# Patient Record
Sex: FEMALE | Race: BLACK OR AFRICAN AMERICAN | Employment: UNEMPLOYED | URBAN - METROPOLITAN AREA
[De-identification: names, ages, dates, MRNs, and addresses within clinical notes are randomized per-mention and may not be internally consistent; named-entity substitution may affect disease eponyms.]

---

## 2017-01-12 ENCOUNTER — OFFICE VISIT (OUTPATIENT)
Dept: INTERNAL MEDICINE CLINIC | Age: 47
End: 2017-01-12

## 2017-01-12 ENCOUNTER — HOSPITAL ENCOUNTER (OUTPATIENT)
Dept: LAB | Age: 47
Discharge: HOME | End: 2017-01-12

## 2017-01-12 VITALS
TEMPERATURE: 98.8 F | OXYGEN SATURATION: 98 % | BODY MASS INDEX: 27.64 KG/M2 | HEIGHT: 66 IN | RESPIRATION RATE: 18 BRPM | HEART RATE: 88 BPM | DIASTOLIC BLOOD PRESSURE: 89 MMHG | SYSTOLIC BLOOD PRESSURE: 122 MMHG | WEIGHT: 172 LBS

## 2017-01-12 DIAGNOSIS — Z71.89 ADVANCE CARE PLANNING: ICD-10-CM

## 2017-01-12 DIAGNOSIS — Z12.31 ENCOUNTER FOR SCREENING MAMMOGRAM FOR MALIGNANT NEOPLASM OF BREAST: ICD-10-CM

## 2017-01-12 DIAGNOSIS — L30.9 ECZEMA, UNSPECIFIED TYPE: ICD-10-CM

## 2017-01-12 DIAGNOSIS — Z13.220 SCREENING FOR LIPID DISORDERS: ICD-10-CM

## 2017-01-12 DIAGNOSIS — F41.9 ANXIETY: ICD-10-CM

## 2017-01-12 DIAGNOSIS — Z13.29 SCREENING FOR ENDOCRINE, NUTRITIONAL, METABOLIC AND IMMUNITY DISORDER: ICD-10-CM

## 2017-01-12 DIAGNOSIS — Z13.21 SCREENING FOR ENDOCRINE, NUTRITIONAL, METABOLIC AND IMMUNITY DISORDER: ICD-10-CM

## 2017-01-12 DIAGNOSIS — Z13.0 SCREENING FOR ENDOCRINE, NUTRITIONAL, METABOLIC AND IMMUNITY DISORDER: ICD-10-CM

## 2017-01-12 DIAGNOSIS — Z23 ENCOUNTER FOR IMMUNIZATION: ICD-10-CM

## 2017-01-12 DIAGNOSIS — Z13.228 SCREENING FOR ENDOCRINE, NUTRITIONAL, METABOLIC AND IMMUNITY DISORDER: ICD-10-CM

## 2017-01-12 DIAGNOSIS — F17.210 CIGARETTE NICOTINE DEPENDENCE WITHOUT COMPLICATION: ICD-10-CM

## 2017-01-12 DIAGNOSIS — Z00.00 ADULT GENERAL MEDICAL EXAMINATION: Primary | ICD-10-CM

## 2017-01-12 PROCEDURE — 99001 SPECIMEN HANDLING PT-LAB: CPT | Performed by: NURSE PRACTITIONER

## 2017-01-12 RX ORDER — BUSPIRONE HYDROCHLORIDE 15 MG/1
15 TABLET ORAL 2 TIMES DAILY
Qty: 60 TAB | Refills: 11 | Status: SHIPPED | OUTPATIENT
Start: 2017-01-12 | End: 2018-02-19 | Stop reason: SDUPTHER

## 2017-01-12 RX ORDER — IBUPROFEN 200 MG
1 TABLET ORAL EVERY 24 HOURS
Qty: 30 PATCH | Refills: 2 | Status: SHIPPED | OUTPATIENT
Start: 2017-01-12 | End: 2017-06-13 | Stop reason: SDUPTHER

## 2017-01-12 RX ORDER — FLUTICASONE PROPIONATE 0.5 MG/G
CREAM TOPICAL 2 TIMES DAILY
Qty: 15 G | Refills: 0 | Status: SHIPPED | OUTPATIENT
Start: 2017-01-12 | End: 2017-02-08 | Stop reason: SDUPTHER

## 2017-01-12 NOTE — PATIENT INSTRUCTIONS
Eat a balanced diet, low-carb, low-salt AND exercise at least 150 minutes per week of moderate activity. If you begin to feel short of breath, dizzy, lightheaded, or have chest pain; STOP. If your symptoms DO NOT resolve after several minutes, DO NOT resume activity; you may need to call the office, report to an urgent care facility, or ER for chest pain.     !!!!!STOP SMOKING!!!!!     Call [phone]/([phone]) for counseling and support. Visit [email or web address]. gov for online support, live chat, and text messaging. Advance Directives: Care Instructions  Your Care Instructions  An advance directive is a legal way to state your wishes at the end of your life. It tells your family and your doctor what to do if you can no longer say what you want. There are two main types of advance directives. You can change them any time that your wishes change. · A living will tells your family and your doctor your wishes about life support and other treatment. · A medical power of  lets you name a person to make treatment decisions for you when you can't speak for yourself. This person is called a health care agent. If you do not have an advance directive, decisions about your medical care may be made by a doctor or a  who doesn't know you. It may help to think of an advance directive as a gift to the people who care for you. If you have one, they won't have to make tough decisions by themselves. Follow-up care is a key part of your treatment and safety. Be sure to make and go to all appointments, and call your doctor if you are having problems. It's also a good idea to know your test results and keep a list of the medicines you take. How can you care for yourself at home? · Discuss your wishes with your loved ones and your doctor. This way, there are no surprises. · Many states have a unique form. Or you might use a universal form that has been approved by many states.  This kind of form can sometimes be completed and stored online. Your electronic copy will then be available wherever you have a connection to the Internet. In most cases, doctors will respect your wishes even if you have a form from a different state. · You don't need a  to do an advance directive. But you may want to get legal advice. · Think about these questions when you prepare an advance directive:  ¨ Who do you want to make decisions about your medical care if you are not able to? Many people choose a family member, close friend, or doctor. ¨ Do you know enough about life support methods that might be used? If not, talk to your doctor so you understand. ¨ What are you most afraid of that might happen? You might be afraid of having pain, losing your independence, or being kept alive by machines. ¨ Where would you prefer to die? Choices include your home, a hospital, or a nursing home. ¨ Would you like to have information about hospice care to support you and your family? ¨ Do you want to donate organs when you die? ¨ Do you want certain Hinduism practices performed before you die? If so, put your wishes in the advance directive. · Read your advance directive every year, and make changes as needed. When should you call for help? Be sure to contact your doctor if you have any questions. Where can you learn more? Go to http://laurie-cherri.info/. Enter R264 in the search box to learn more about \"Advance Directives: Care Instructions. \"  Current as of: February 24, 2016  Content Version: 11.1  © 6560-6717 [employer]. Care instructions adapted under license by [employer] (which disclaims liability or warranty for this information). If you have questions about a medical condition or this instruction, always ask your healthcare professional. [address] any warranty or liability for your use of this information.        Mammogram: About This Test  What is it? A mammogram is an X-ray of the breast that is used to screen for breast cancer. This test can find tumors that are too small for you or your doctor to feel. Cancer is most easily treated and cured when it is found at an early stage. Why is this test done? A mammogram is done to:  · Look for breast cancer in women who don't have symptoms. · Find breast cancer in women who have symptoms. Symptoms of breast cancer may include a lump or thickening in the breast, nipple discharge, or dimpling of the skin on one area of the breast.  · Find an area of suspicious breast tissue to remove for an exam under a microscope (biopsy). How can you prepare for the test?  · Tell your doctor if you:  ¨ Are or might be pregnant. ¨ Are breastfeeding. ¨ Have breast implants. ¨ Have previously had a breast biopsy. · On the day of the test, don't use any deodorant, perfume, powders, or ointments. What happens before the test?  · You will need to take off any jewelry that might interfere with the X-ray pictures. · You will need to take off your clothes above the waist.  · You will be given a cloth or paper gown to use during the test.  What happens during the test?  · You usually stand during a mammogram.  · One at a time, your breasts will be placed on a flat plate that contains the X-ray film. · Another plate is then pressed firmly against your breast to help flatten out the breast tissue. You may be asked to lift your arm. · For a few seconds while the X-ray picture is being taken, you will need to hold your breath. · At least two pictures are taken of each breast. One is taken from the top and one from the side. What else should you know about the test?  · The X-ray plate will feel cold when you place your breast on it. Having your breasts flattened and squeezed isn't comfortable. But it is necessary to flatten out the breast tissue to get the best pictures.   · Mammograms do not prevent breast cancer or reduce a woman's risk of developing cancer. · Most things that are found during a mammogram are not breast cancer. How long does the test take? · The test will take about 10 to 15 minutes. You may be in the clinic for up to an hour. What happens after the test?  · You will probably be able to go home right away. · You can go back to your usual activities right away. Follow-up care is a key part of your treatment and safety. Be sure to make and go to all appointments, and call your doctor if you are having problems. It's also a good idea to keep a list of the medicines you take. Ask your doctor when you can expect to have your test results. Where can you learn more? Go to http://laurie-cherri.info/. Enter F993 in the search box to learn more about \"Mammogram: About This Test.\"  Current as of: July 26, 2016  Content Version: 11.1  © 6849-4721 [employer]. Care instructions adapted under license by [employer] (which disclaims liability or warranty for this information). If you have questions about a medical condition or this instruction, always ask your healthcare professional. [place] [address] any warranty or liability for your use of this information. Learning About Benefits From Quitting Smoking  How does quitting smoking make you healthier? If you're thinking about quitting smoking, you may have a few reasons to be smoke-free. Your health may be one of them. · When you quit smoking, you lower your risks for cancer, lung disease, heart attack, stroke, blood vessel disease, and blindness from macular degeneration. · When you're smoke-free, you get sick less often, and you heal faster. You are less likely to get colds, flu, bronchitis, and pneumonia. · As a nonsmoker, you may find that your mood is better and you are less stressed. When and how will you feel healthier?   Quitting has real health benefits that start from day 1 of being smoke-free. And the longer you stay smoke-free, the healthier you get and the better you feel. The first hours  · After just 20 minutes, your blood pressure and heart rate go down. That means there's less stress on your heart and blood vessels. · Within 12 hours, the level of carbon monoxide in your blood drops back to normal. That makes room for more oxygen. With more oxygen in your body, you may notice that you have more energy than when you smoked. After 2 weeks  · Your lungs start to work better. · Your risk of heart attack starts to drop. After 1 month  · When your lungs are clear, you cough less and breathe deeper, so it's easier to be active. · Your sense of taste and smell return. That means you can enjoy food more than you have since you started smoking. Over the years  · After 1 year, your risk of heart disease is half what it would be if you kept smoking. · After 5 years, your risk of stroke starts to shrink. Within a few years after that, it's about the same as if you'd never smoked. · After 10 years, your risk of dying from lung cancer is cut by about half. And your risk for many other types of cancer is lower too. How would quitting help others in your life? When you quit smoking, you improve the health of everyone who now breathes in your smoke. · Their heart, lung, and cancer risks drop, much like yours. · They are sick less. For babies and small children, living smoke-free means they're less likely to have ear infections, pneumonia, and bronchitis. · If you're a woman who is or will be pregnant someday, quitting smoking means a healthier . · Children who are close to you are less likely to become adult smokers. Where can you learn more? Go to http://laurie-cherri.info/. Enter [id number] in the search box to learn more about \"Learning About Benefits From Quitting Smoking. \"  Current as of: May 26, 2016  Content Version: 11.1  © 5536-6449 [employer]. Care instructions adapted under license by [employer] (which disclaims liability or warranty for this information). If you have questions about a medical condition or this instruction, always ask your healthcare professional. [address] any warranty or liability for your use of this information. Atopic Dermatitis: Care Instructions  Your Care Instructions  Atopic dermatitis (also called eczema) is a skin problem that causes intense itching and a red, raised rash. In severe cases, the rash develops clear fluid-filled blisters. The rash is not contagious. People with this condition seem to have very sensitive immune systems that are likely to react to things that cause allergies. The immune system is the body's way of fighting infection. There is no cure for atopic dermatitis, but you may be able to control it with care at home. Follow-up care is a key part of your treatment and safety. Be sure to make and go to all appointments, and call your doctor if you are having problems. It's also a good idea to know your test results and keep a list of the medicines you take. How can you care for yourself at home? · Use moisturizer at least twice a day. · If your doctor prescribes a cream, use it as directed. If your doctor prescribes other medicine, take it exactly as directed. · Wash the affected area with water only. Soap can make dryness and itching worse. Pat dry. · Apply a moisturizer after bathing. Use a cream such as Lubriderm, Moisturel, or Cetaphil that does not irritate the skin or cause a rash. Apply the cream while your skin is still damp after lightly drying with a towel. · Use cold, wet cloths to reduce itching. · Keep cool, and stay out of the sun. · If itching affects your normal activities, an over-the-counter antihistamine, such as diphenhydramine (Benadryl) or loratadine (Claritin) may help. Read and follow all instructions on the label.   When should you call for help? Call your doctor now or seek immediate medical care if:  · Your rash gets worse and you have a fever. · You have new blisters or bruises, or the rash spreads and looks like a sunburn. · You have signs of infection, such as:  ¨ Increased pain, swelling, warmth, or redness. ¨ Red streaks leading from the rash. ¨ Pus draining from the rash. ¨ A fever. · You have crusting or oozing sores. · You have joint aches or body aches along with your rash. Watch closely for changes in your health, and be sure to contact your doctor if:  · Your rash does not clear up after 2 to 3 weeks of home treatment. · Itching interferes with your sleep or daily activities. Where can you learn more? Go to http://laurie-cherri.info/. Enter D925 in the search box to learn more about \"Atopic Dermatitis: Care Instructions. \"  Current as of: February 5, 2016  Content Version: 11.1  © 6928-9685 [employer]. Care instructions adapted under license by [employer] (which disclaims liability or warranty for this information). If you have questions about a medical condition or this instruction, always ask your healthcare professional. [place] [address] any warranty or liability for your use of this information.

## 2017-01-12 NOTE — MR AVS SNAPSHOT
Visit Information Date & Time Provider Department Dept. Phone Encounter #  
 1/12/2017  8:40 AM Mallory Coello NP [address] [facility] [phone] [id number] Follow-up Instructions Return in about 6 months (around 7/12/2017) for 6 month f/u. Upcoming Health Maintenance Date Due Pneumococcal 19-64 Medium Risk (1 of 1 - PPSV23) 5/7/1990 PAP AKA CERVICAL CYTOLOGY 5/7/1992 INFLUENZA AGE 9 TO ADULT 8/1/2016 DTaP/Tdap/Td series (2 - Td) 12/14/2025 Allergies as of 1/12/2017  Review Complete On: 1/12/2017 By: Mallory Coello NP No Known Allergies Current Immunizations  Reviewed on 12/14/2015 Name Date Influenza Vaccine Intradermal PF 12/14/2015 Tdap 12/14/2015 Not reviewed this visit You Were Diagnosed With   
  
 Codes Comments Adult general medical examination    -  Primary ICD-10-CM: Z00.00 ICD-9-CM: V70.9 Advance care planning     ICD-10-CM: Z71.89 ICD-9-CM: V65.49 Anxiety     ICD-10-CM: F41.9 ICD-9-CM: 300.00 Eczema, unspecified type     ICD-10-CM: L30.9 ICD-9-CM: 692.9 Screening for lipid disorders     ICD-10-CM: Z13.220 ICD-9-CM: V77.91 Screening for endocrine, nutritional, metabolic and immunity disorder     ICD-10-CM: Z13.29, Z13.21, Z13.228, Z13.0 ICD-9-CM: V77.99 Encounter for screening mammogram for malignant neoplasm of breast     ICD-10-CM: Z12.31 
ICD-9-CM: V76.12 Cigarette nicotine dependence without complication     [facility]-YT: F17.210 ICD-9-CM: 305.1 Vitals BP Pulse Temp Resp Height(growth percentile) Weight(growth percentile) 122/89 (BP 1 Location: Left arm, BP Patient Position: Sitting) 88 98.8 °F (37.1 °C) (Oral) 18 5' 6\" (1.676 m) 172 lb (78 kg) SpO2 BMI OB Status Smoking Status 98% 27.76 kg/m2 Hysterectomy Current Every Day Smoker Vitals History BMI and BSA Data  Body Mass Index Body Surface Area  
 27.76 kg/m 2 1.91 m 2  
  
  
 [facility] [phone] Your Updated Medication List  
  
   
This list is accurate as of: 1/12/17  9:45 AM.  Always use your most recent med list.  
  
  
  
  
 busPIRone 15 mg tablet Commonly known as:  BUSPAR Take twice daily for 4 days. On Week 2 take three times daily, then on. fluticasone 0.05 % topical cream  
Commonly known as:  Aleksandra Skiff Apply  to affected area two (2) times a day. Prescriptions Sent to Pharmacy Refills  
 fluticasone (CUTIVATE) 0.05 % topical cream 0 Sig: Apply  to affected area two (2) times a day. Class: Normal  
 [facility] Ph #: [phone] Route: Topical  
  
We Performed the Following CBC WITH AUTOMATED DIFF [72732 CPT(R)] LIPID PANEL [44355 CPT(R)] METABOLIC PANEL, COMPREHENSIVE [74069 CPT(R)] TSH 3RD GENERATION [33241 CPT(R)] VITAMIN D, 25 HYDROXY [id number] CPT(R)] Follow-up Instructions Return in about 6 months (around 7/12/2017) for 6 month f/u. To-Do List   
 01/12/2017 Imaging:  [facility] M[facility]MO BI SCREENING INCL CAD Patient Instructions Eat a balanced diet, low-carb, low-salt AND exercise at least 150 minutes per week of moderate activity. If you begin to feel short of breath, dizzy, lightheaded, or have chest pain; STOP. If your symptoms DO NOT resolve after several minutes, DO NOT resume activity; you may need to call the office, report to an urgent care facility, or ER for chest pain.  
 
!!!!!STOP SMOKING!!!!!  
 
Call [phone]/([phone]) for counseling and support. Visit [email or web address]. gov for online support, live chat, and text messaging. Advance Directives: Care Instructions Your Care Instructions An advance directive is a legal way to state your wishes at the end of your life. It tells your family and your doctor what to do if you can no longer say what you want. There are two main types of advance directives. You can change them any time that your wishes change. · A living will tells your family and your doctor your wishes about life support and other treatment. · A medical power of  lets you name a person to make treatment decisions for you when you can't speak for yourself. This person is called a health care agent. If you do not have an advance directive, decisions about your medical care may be made by a doctor or a  who doesn't know you. It may help to think of an advance directive as a gift to the people who care for you. If you have one, they won't have to make tough decisions by themselves. Follow-up care is a key part of your treatment and safety. Be sure to make and go to all appointments, and call your doctor if you are having problems. It's also a good idea to know your test results and keep a list of the medicines you take. How can you care for yourself at home? · Discuss your wishes with your loved ones and your doctor. This way, there are no surprises. · Many states have a unique form. Or you might use a universal form that has been approved by many states. This kind of form can sometimes be completed and stored online. Your electronic copy will then be available wherever you have a connection to the Internet. In most cases, doctors will respect your wishes even if you have a form from a different state. · You don't need a  to do an advance directive. But you may want to get legal advice. · Think about these questions when you prepare an advance directive: ¨ Who do you want to make decisions about your medical care if you are not able to? Many people choose a family member, close friend, or doctor. ¨ Do you know enough about life support methods that might be used? If not, talk to your doctor so you understand. ¨ What are you most afraid of that might happen? You might be afraid of having pain, losing your independence, or being kept alive by machines. ¨ Where would you prefer to die? Choices include your home, a hospital, or a nursing home. ¨ Would you like to have information about hospice care to support you and your family? ¨ Do you want to donate organs when you die? ¨ Do you want certain [employer]ist practices performed before you die? If so, put your wishes in the advance directive. · Read your advance directive every year, and make changes as needed. When should you call for help? Be sure to contact your doctor if you have any questions. Where can you learn more? Go to http://laurie-cherri.info/. Enter R264 in the search box to learn more about \"Advance Directives: Care Instructions. \" Current as of: February 24, 2016 Content Version: 11.1 © 2006-2016 Apps & Zerts. Care instructions adapted under license by Right On Interactive (which disclaims liability or warranty for this information). If you have questions about a medical condition or this instruction, always ask your healthcare professional. [place] [address] any warranty or liability for your use of this information. Mammogram: About This Test 
What is it? A mammogram is an X-ray of the breast that is used to screen for breast cancer. This test can find tumors that are too small for you or your doctor to feel. Cancer is most easily treated and cured when it is found at an early stage. Why is this test done? A mammogram is done to: 
· Look for breast cancer in women who don't have symptoms. · Find breast cancer in women who have symptoms. Symptoms of breast cancer may include a lump or thickening in the breast, nipple discharge, or dimpling of the skin on one area of the breast. 
· Find an area of suspicious breast tissue to remove for an exam under a microscope (biopsy). How can you prepare for the test? 
· Tell your doctor if you: ¨ Are or might be pregnant. ¨ Are breastfeeding. ¨ Have breast implants. ¨ Have previously had a breast biopsy. · On the day of the test, don't use any deodorant, perfume, powders, or ointments. What happens before the test? 
· You will need to take off any jewelry that might interfere with the X-ray pictures. · You will need to take off your clothes above the waist. 
· You will be given a cloth or paper gown to use during the test. 
What happens during the test? 
· You usually stand during a mammogram. 
· One at a time, your breasts will be placed on a flat plate that contains the X-ray film. · Another plate is then pressed firmly against your breast to help flatten out the breast tissue. You may be asked to lift your arm. · For a few seconds while the X-ray picture is being taken, you will need to hold your breath. · At least two pictures are taken of each breast. One is taken from the top and one from the side. What else should you know about the test? 
· The X-ray plate will feel cold when you place your breast on it. Having your breasts flattened and squeezed isn't comfortable. But it is necessary to flatten out the breast tissue to get the best pictures. · Mammograms do not prevent breast cancer or reduce a woman's risk of developing cancer. · Most things that are found during a mammogram are not breast cancer. How long does the test take? · The test will take about 10 to 15 minutes. You may be in the clinic for up to an hour. What happens after the test? 
· You will probably be able to go home right away. · You can go back to your usual activities right away. Follow-up care is a key part of your treatment and safety. Be sure to make and go to all appointments, and call your doctor if you are having problems. It's also a good idea to keep a list of the medicines you take. Ask your doctor when you can expect to have your test results. Where can you learn more? Go to http://laurie-cherri.info/. Enter S420 in the search box to learn more about \"Mammogram: About This Test.\" Current as of: July 26, 2016 Content Version: 11.1 © 3135-6402 Gigwalk. Care instructions adapted under license by Blokify (which disclaims liability or warranty for this information). If you have questions about a medical condition or this instruction, always ask your healthcare professional. [address] any warranty or liability for your use of this information. Learning About Benefits From Quitting Smoking How does quitting smoking make you healthier? If you're thinking about quitting smoking, you may have a few reasons to be smoke-free. Your health may be one of them. · When you quit smoking, you lower your risks for cancer, lung disease, heart attack, stroke, blood vessel disease, and blindness from macular degeneration. · When you're smoke-free, you get sick less often, and you heal faster. You are less likely to get colds, flu, bronchitis, and pneumonia. · As a nonsmoker, you may find that your mood is better and you are less stressed. When and how will you feel healthier? Quitting has real health benefits that start from day 1 of being smoke-free. And the longer you stay smoke-free, the healthier you get and the better you feel. The first hours · After just 20 minutes, your blood pressure and heart rate go down. That means there's less stress on your heart and blood vessels. · Within 12 hours, the level of carbon monoxide in your blood drops back to normal. That makes room for more oxygen. With more oxygen in your body, you may notice that you have more energy than when you smoked. After 2 weeks · Your lungs start to work better. · Your risk of heart attack starts to drop. After 1 month · When your lungs are clear, you cough less and breathe deeper, so it's easier to be active. · Your sense of taste and smell return. That means you can enjoy food more than you have since you started smoking. Over the years · After 1 year, your risk of heart disease is half what it would be if you kept smoking. · After 5 years, your risk of stroke starts to shrink. Within a few years after that, it's about the same as if you'd never smoked. · After 10 years, your risk of dying from lung cancer is cut by about half. And your risk for many other types of cancer is lower too. How would quitting help others in your life? When you quit smoking, you improve the health of everyone who now breathes in your smoke. · Their heart, lung, and cancer risks drop, much like yours. · They are sick less. For babies and small children, living smoke-free means they're less likely to have ear infections, pneumonia, and bronchitis. · If you're a woman who is or will be pregnant someday, quitting smoking means a healthier . · Children who are close to you are less likely to become adult smokers. Where can you learn more? Go to http://laurieEco-Sitecherri.info/. Enter [id number] in the search box to learn more about \"Learning About Benefits From Quitting Smoking. \" Current as of: May 26, 2016 Content Version: 11.1 © 0729-7051 G2 Microsystems. Care instructions adapted under license by ZikBit (which disclaims liability or warranty for this information). If you have questions about a medical condition or this instruction, always ask your healthcare professional. [place] [address] any warranty or liability for your use of this information. Atopic Dermatitis: Care Instructions Your Care Instructions Atopic dermatitis (also called eczema) is a skin problem that causes intense itching and a red, raised rash.  In severe cases, the rash develops clear fluidfilled blisters. The rash is not contagious. People with this condition seem to have very sensitive immune systems that are likely to react to things that cause allergies. The immune system is the body's way of fighting infection. There is no cure for atopic dermatitis, but you may be able to control it with care at home. Follow-up care is a key part of your treatment and safety. Be sure to make and go to all appointments, and call your doctor if you are having problems. It's also a good idea to know your test results and keep a list of the medicines you take. How can you care for yourself at home? · Use moisturizer at least twice a day. · If your doctor prescribes a cream, use it as directed. If your doctor prescribes other medicine, take it exactly as directed. · Wash the affected area with water only. Soap can make dryness and itching worse. Pat dry. · Apply a moisturizer after bathing. Use a cream such as Lubriderm, Moisturel, or Cetaphil that does not irritate the skin or cause a rash. Apply the cream while your skin is still damp after lightly drying with a towel. · Use cold, wet cloths to reduce itching. · Keep cool, and stay out of the sun. · If itching affects your normal activities, an over-the-counter antihistamine, such as diphenhydramine (Benadryl) or loratadine (Claritin) may help. Read and follow all instructions on the label. When should you call for help? Call your doctor now or seek immediate medical care if: 
· Your rash gets worse and you have a fever. · You have new blisters or bruises, or the rash spreads and looks like a sunburn. · You have signs of infection, such as: 
¨ Increased pain, swelling, warmth, or redness. ¨ Red streaks leading from the rash. ¨ Pus draining from the rash. ¨ A fever. · You have crusting or oozing sores. · You have joint aches or body aches along with your rash.  
Watch closely for changes in your health, and be sure to contact your doctor if: 
· Your rash does not clear up after 2 to 3 weeks of home treatment. · Itching interferes with your sleep or daily activities. Where can you learn more? Go to http://laurie-cherri.info/. Enter L104 in the search box to learn more about \"Atopic Dermatitis: Care Instructions. \" Current as of: February 5, 2016 Content Version: 11.1 © 2742-3197 ExaGrid Systems. Care instructions adapted under license by Promosome (which disclaims liability or warranty for this information). If you have questions about a medical condition or this instruction, always ask your healthcare professional. [place] [address] any warranty or liability for your use of this information. Introducing [facility]! [facility] introduces Trovit patient portal. Now you can access parts of your medical record, email your doctor's office, and request medication refills online. 1. In your internet browser, go to [email or web address] 2. Click on the First Time User? Click Here link in the Sign In box. You will see the New Member Sign Up page. 3. Enter your Pax8t Access Code exactly as it appears below. You will not need to use this code after youve completed the sign-up process. If you do not sign up before the expiration date, you must request a new code. · Trovit Access Code: [facility] Expires: 4/12/2017  8:42 AM 
 
4. Enter the last four digits of your Social Security Number (xxxx) and Date of Birth (mm/dd/yyyy) as indicated and click Submit. You will be taken to the next sign-up page. 5. Create a Pax8t ID. This will be your Trovit login ID and cannot be changed, so think of one that is secure and easy to remember. 6. Create a Trovit password. You can change your password at any time. 7. Enter your Password Reset Question and Answer. This can be used at a later time if you forget your password. 8. Enter your e-mail address. You will receive e-mail notification when new information is available in [address] 9. Click Sign Up. You can now view and download portions of your medical record. 10. Click the Download Summary menu link to download a portable copy of your medical information. If you have questions, please visit the Frequently Asked Questions section of the HipFlat website. Remember, HipFlat is NOT to be used for urgent needs. For medical emergencies, dial 911. Now available from your iPhone and Android! Please provide this summary of care documentation to your next provider. Your primary care clinician is listed as LEW Mccartney. If you have any questions after today's visit, please call [phone].

## 2017-01-12 NOTE — PROGRESS NOTES
Dorothy Baez is a 40 y.o. female and presents with Anxiety (follow up) and Annual Exam    Subjective: Dorothy Baez is a 40 y.o. female presenting for annual checkup. ROS: Feeling well. No dyspnea or chest pain on exertion. No abdominal pain, change in bowel habits, black or bloody stools. No urinary tract or prostatic symptoms. No neurological complaints. Specific concerns today: rash on right foot with itching and still having anxiety. Treated well with Buspar BID. Had grandchild since last OV and very pre-occupied with helping to care for her. Discussed ADVANCED DIRECTIVE: yes  Advanced Directive on File: no    Last PAP BEFORE hysterectomy 5 yr ago, unsure of testing AFTER. Unsure of last MAMMO. Smoking cessation Review:  Patient presents to discuss smoking cessation. The patient currently is smoking 1/2  ppd. She has smoked for multiple  years. She has tried to quit multiple times in the past using gum, but felt it was too strong. Would like to quit by taking patches.       Review of Systems  Constitutional: negative for fevers, chills, anorexia and weight loss  Eyes:   negative for visual disturbance, drainage, and irritation  ENT:   negative for tinnitus,sore throat,nasal congestion,ear pain,and hoarseness  Respiratory:  negative for cough, hemoptysis, dyspnea, and wheezing  CV:   negative for chest pain, palpitations, and lower extremity edema  GI:   negative for nausea, vomiting, diarrhea, abdominal pain, and melena  Endo:               negative for polyuria,polydipsia,polyphagia, and heat intolerance  Genitourinary: negative for frequency, urgency, dysuria, retention, and hematuria  Integument:  negative for ulcerations  Hematologic:  negative for easy bruising and bleeding  Musculoskel: negative for arthralgias, muscle weakness,and joint pain/swelling  Neurological:  negative for headaches, dizziness, vertigo,and memory/gait problems  Behavl/Psych: negative for feelings of depression, suicide, and mood changes    History reviewed. No pertinent past medical history. Past Surgical History   Procedure Laterality Date    Hx partial hysterectomy       Social History     Social History    Marital status: SINGLE     Spouse name: N/A    Number of children: N/A    Years of education: N/A     Social History Main Topics    Smoking status: Current Every Day Smoker    Smokeless tobacco: None    Alcohol use 0.0 oz/week     0 Standard drinks or equivalent per week      Comment: social    Drug use: No    Sexual activity: Yes     Partners: Male     Birth control/ protection: None     Other Topics Concern    None     Social History Narrative     Family History   Problem Relation Age of Onset    Cancer Mother      cancer of the blood    Diabetes Maternal Aunt      Current Outpatient Prescriptions   Medication Sig Dispense Refill    fluticasone (CUTIVATE) 0.05 % topical cream Apply  to affected area two (2) times a day. 15 g 0    nicotine (NICODERM CQ) 14 mg/24 hr patch 1 Patch by TransDERmal route every twenty-four (24) hours for 30 days. Indications: SMOKING CESSATION 30 Patch 2    busPIRone (BUSPAR) 15 mg tablet Take 1 Tab by mouth two (2) times a day. Take twice daily for 4 days. On Week 2 take three times daily, then on. 60 Tab 11     No Known Allergies    Objective:  Visit Vitals    /89 (BP 1 Location: Left arm, BP Patient Position: Sitting)    Pulse 88    Temp 98.8 °F (37.1 °C) (Oral)    Resp 18    Ht 5' 6\" (1.676 m)    Wt 172 lb (78 kg)    SpO2 98%    BMI 27.76 kg/m2     Wt Readings from Last 3 Encounters:   01/12/17 172 lb (78 kg)   04/07/16 164 lb (74.4 kg)   12/14/15 158 lb (71.7 kg)     Physical Exam:   General appearance - alert, well appearing, and in no distress. Mental status - A/O x 4, normal mood and affect. Head/Eyes- AT/NC. EDUARDO, EOMI, corneas normal, no foreign bodies. Ears- TM intact bilaterally, no erythema or drainage. Nose- Septum midline, pink mucosa. Turbinates normal, no polyps or erythema. No sinus tenderness. Mouth/Throat - mucous membranes moist, pharynx normal without lesions. No tonsillar swelling or exudates. Neck -Supple ,normal CSP. FROM, non-tender. No adenopathy/thyromegaly. No JVD. Chest - CTA. Symmetric chest rise. No wheezing, rales or rhonchi. Heart - Normal rate, regular rhythm. Normal S1, S2. No MGR. Abdomen - Soft,non-distended. Normoactive BS in all quadrants. NT, no mass, rebound, or HSM   Ext- Radial, DP pulses, 2+ bilaterally. No pedal edema, clubbing, or cyanosis. Skin- Normal for ethnicity, warm, and dry. No hyperpigmentation, ulcerations, or suspicious lesions. Maculopapular rash to right foot dorsum. Neuro - Normal speech, no focal findings. Normal strength and muscle tone. Coordination and gait normal.      No results found for this or any previous visit. Assessment/Plan:  MAMMO and labs ordered. Refilled buspar BID. cutivate for dermatitis and skin care reviewed. Anti-inflammatory LCS advised. Smoking cessation discussed for 3-10 minutes, pt planning to patches, sent meds today. Pt has given consent verbally while in office for [employer]. ICD-10-CM ICD-9-CM    1. Adult general medical examination Z00.00 V70.9 fluticasone (CUTIVATE) 0.05 % topical cream      LIPID PANEL      METABOLIC PANEL, COMPREHENSIVE      CBC WITH AUTOMATED DIFF      TSH 3RD GENERATION      VITAMIN D, 25 HYDROXY   2. Advance care planning Z71.89 V65.49    3. Anxiety F41.9 300.00 busPIRone (BUSPAR) 15 mg tablet   4. Eczema, unspecified type L30.9 692.9 fluticasone (CUTIVATE) 0.05 % topical cream   5. Screening for lipid disorders Z13.220 V77.91 LIPID PANEL   6. Screening for endocrine, nutritional, metabolic and immunity disorder W26.36 I04.09 METABOLIC PANEL, COMPREHENSIVE    Z13.21  CBC WITH AUTOMATED DIFF    Z13.228  TSH 3RD GENERATION    Z13.0  VITAMIN D, 25 HYDROXY   7.  Encounter for screening mammogram for malignant neoplasm of breast Z12.31 V76.12 [facility] BI SCREENING INCL CAD   8. Cigarette nicotine dependence without complication P41.004 690.9 nicotine (NICODERM CQ) 14 mg/24 hr patch   9. Encounter for immunization Z23 V03.89 PNEUMOCOCCAL POLYSACCHARIDE VACCINE, 23-VALENT, ADULT OR IMMUNOSUPPRESSED PT DOSE,     Orders Placed This Encounter    [facility] BI SCREENING INCL CAD     Standing Status:   Future     Standing Expiration Date:   2018     Order Specific Question:   Reason for Exam     Answer:   breast cancer screening     Order Specific Question:   Is Patient Pregnant? Answer:   No    PNEUMOCOCCAL POLYSACCHARIDE VACCINE, 23-VALENT, ADULT OR IMMUNOSUPPRESSED PT DOSE,    LIPID PANEL    METABOLIC PANEL, COMPREHENSIVE    CBC WITH AUTOMATED DIFF    TSH 3RD GENERATION    VITAMIN D, 25 HYDROXY    fluticasone (CUTIVATE) 0.05 % topical cream     Sig: Apply  to affected area two (2) times a day. Dispense:  15 g     Refill:  0    nicotine (NICODERM CQ) 14 mg/24 hr patch     Si Patch by TransDERmal route every twenty-four (24) hours for 30 days. Indications: SMOKING CESSATION     Dispense:  30 Patch     Refill:  2    busPIRone (BUSPAR) 15 mg tablet     Sig: Take 1 Tab by mouth two (2) times a day. Take twice daily for 4 days. On Week 2 take three times daily, then on. Dispense:  60 Tab     Refill:  11     Follow-up Disposition:  Return in about 6 months (around 2017) for 6 month f/u. Jennifer Coyle expressed understanding of plan. An After Visit Summary was offered/printed and given to the patient.

## 2017-01-12 NOTE — ACP (ADVANCE CARE PLANNING)
Advanced care planning- discussed Advance directive, Medical POA, and life sustaining options. Given/Mailing honoring amaysim paper work and contact info for [facility] to complete paperwork in office. Advance Care Planning (ACP) Provider Conversation Snapshot    Date of ACP Conversation: 01/12/17  Persons included in Conversation:  Patient/family  Length of ACP Conversation in minutes:  5-10 minutes    Authorized Decision Maker (if patient is incapable of making informed decisions): This person is:   Healthcare Agent/Medical Power of  under Advance Directive          For Patients with Decision Making Capacity:   Values/Goals: Exploration of values, goals, and preferences if recovery is not expected, even with continued medical treatment in the event of:  Severe, permanent brain injury  \"In these circumstances, what matters most to you? \"  Care focused more on comfort or quality of life.     Conversation Outcomes / Follow-Up Plan:   Recommended completion of Advance Directive form after review of ACP materials and conversation with prospective healthcare agent   Referral made for ACP follow-up assistance to:  Nurse navigator

## 2017-01-12 NOTE — PROGRESS NOTES
1. Have you been to the ER, urgent care clinic since your last visit? Hospitalized since your last visit? No    2. Have you seen or consulted any other health care providers outside of the [address] [place] [address] since your last visit? Include any pap smears or colon screening. No     Pt is here for   Chief Complaint   Patient presents with    Anxiety     follow up     Pt denies pain at this time. .. Troy Orantes is a 40 y.o. female who presents for routine immunizations. She denies any symptoms , reactions or allergies that would exclude them from being immunized today. Risks and adverse reactions were discussed and the VIS was given to them. All questions were addressed. She was observed for 10 min post injection. There were no reactions observed. Barbi Gibson LPN

## 2017-01-13 LAB
25(OH)D3+25(OH)D2 SERPL-MCNC: 9.2 NG/ML (ref 30–100)
ALBUMIN SERPL-MCNC: 4.4 G/DL (ref 3.5–5.5)
ALBUMIN/GLOB SERPL: 1.5 {RATIO} (ref 1.1–2.5)
ALP SERPL-CCNC: 49 IU/L (ref 39–117)
ALT SERPL-CCNC: 12 IU/L (ref 0–32)
AST SERPL-CCNC: 12 IU/L (ref 0–40)
BASOPHILS # BLD AUTO: 0 X10E3/UL (ref 0–0.2)
BASOPHILS NFR BLD AUTO: 0 %
BILIRUB SERPL-MCNC: <0.2 MG/DL (ref 0–1.2)
BUN SERPL-MCNC: 11 MG/DL (ref 6–24)
BUN/CREAT SERPL: 15 (ref 9–23)
CALCIUM SERPL-MCNC: 9.4 MG/DL (ref 8.7–10.2)
CHLORIDE SERPL-SCNC: 102 MMOL/L (ref 96–106)
CHOLEST SERPL-MCNC: 153 MG/DL (ref 100–199)
CO2 SERPL-SCNC: 21 MMOL/L (ref 18–29)
CREAT SERPL-MCNC: 0.72 MG/DL (ref 0.57–1)
EOSINOPHIL # BLD AUTO: 0.1 X10E3/UL (ref 0–0.4)
EOSINOPHIL NFR BLD AUTO: 2 %
ERYTHROCYTE [DISTWIDTH] IN BLOOD BY AUTOMATED COUNT: 12.6 % (ref 12.3–15.4)
GLOBULIN SER CALC-MCNC: 3 G/DL (ref 1.5–4.5)
GLUCOSE SERPL-MCNC: 96 MG/DL (ref 65–99)
HCT VFR BLD AUTO: 38.8 % (ref 34–46.6)
HDLC SERPL-MCNC: 54 MG/DL
HGB BLD-MCNC: 12.9 G/DL (ref 11.1–15.9)
IMM GRANULOCYTES # BLD: 0 X10E3/UL (ref 0–0.1)
IMM GRANULOCYTES NFR BLD: 0 %
INTERPRETATION, 910389: NORMAL
LDLC SERPL CALC-MCNC: 69 MG/DL (ref 0–99)
LYMPHOCYTES # BLD AUTO: 1.9 X10E3/UL (ref 0.7–3.1)
LYMPHOCYTES NFR BLD AUTO: 33 %
MCH RBC QN AUTO: 29.9 PG (ref 26.6–33)
MCHC RBC AUTO-ENTMCNC: 33.2 G/DL (ref 31.5–35.7)
MCV RBC AUTO: 90 FL (ref 79–97)
MONOCYTES # BLD AUTO: 0.3 X10E3/UL (ref 0.1–0.9)
MONOCYTES NFR BLD AUTO: 5 %
NEUTROPHILS # BLD AUTO: 3.4 X10E3/UL (ref 1.4–7)
NEUTROPHILS NFR BLD AUTO: 60 %
PLATELET # BLD AUTO: 371 X10E3/UL (ref 150–379)
POTASSIUM SERPL-SCNC: 4.2 MMOL/L (ref 3.5–5.2)
PROT SERPL-MCNC: 7.4 G/DL (ref 6–8.5)
RBC # BLD AUTO: 4.31 X10E6/UL (ref 3.77–5.28)
SODIUM SERPL-SCNC: 140 MMOL/L (ref 134–144)
TRIGL SERPL-MCNC: 152 MG/DL (ref 0–149)
TSH SERPL DL<=0.005 MIU/L-ACNC: 0.88 UIU/ML (ref 0.45–4.5)
VLDLC SERPL CALC-MCNC: 30 MG/DL (ref 5–40)
WBC # BLD AUTO: 5.7 X10E3/UL (ref 3.4–10.8)

## 2017-01-16 DIAGNOSIS — E55.9 VITAMIN D DEFICIENCY: Primary | ICD-10-CM

## 2017-01-16 RX ORDER — ACETAMINOPHEN 500 MG
2000 TABLET ORAL 2 TIMES DAILY
Qty: 60 CAP | Refills: 11 | Status: SHIPPED | OUTPATIENT
Start: 2017-01-16 | End: 2018-04-09 | Stop reason: SDUPTHER

## 2017-04-08 DIAGNOSIS — L30.9 ECZEMA, UNSPECIFIED TYPE: ICD-10-CM

## 2017-04-08 DIAGNOSIS — Z00.00 ADULT GENERAL MEDICAL EXAMINATION: ICD-10-CM

## 2017-04-10 RX ORDER — FLUTICASONE PROPIONATE 0.5 MG/G
CREAM TOPICAL
Qty: 30 G | Refills: 1 | Status: SHIPPED | OUTPATIENT
Start: 2017-04-10 | End: 2019-07-02 | Stop reason: ALTCHOICE

## 2017-05-02 ENCOUNTER — TELEPHONE (OUTPATIENT)
Dept: INTERNAL MEDICINE CLINIC | Age: 47
End: 2017-05-02

## 2017-05-02 DIAGNOSIS — H53.8 BLURRING OF VISION: Primary | ICD-10-CM

## 2017-05-02 NOTE — TELEPHONE ENCOUNTER
Referred to DR. Krzysztof Wolfe in past for similar complaint, would like to know if she saw him? Otherwise, I have referred her to [facility] with Dr. Amee Black to be evaluated. If they do NOT accept her insurance then she will need to find an in network provider and get their contact information.

## 2017-05-03 NOTE — TELEPHONE ENCOUNTER
Pt was given information  on yesterday for Dr. Venkata Frausto and informed to call us with the date and time of appt in case ins referral is needed.

## 2017-06-13 DIAGNOSIS — F17.210 CIGARETTE NICOTINE DEPENDENCE WITHOUT COMPLICATION: ICD-10-CM

## 2017-06-15 RX ORDER — IBUPROFEN 200 MG
TABLET ORAL
Qty: 28 PATCH | Refills: 2 | Status: SHIPPED | OUTPATIENT
Start: 2017-06-15 | End: 2018-04-06 | Stop reason: SDUPTHER

## 2018-04-06 ENCOUNTER — OFFICE VISIT (OUTPATIENT)
Dept: INTERNAL MEDICINE CLINIC | Age: 48
End: 2018-04-06

## 2018-04-06 VITALS
SYSTOLIC BLOOD PRESSURE: 145 MMHG | HEART RATE: 81 BPM | BODY MASS INDEX: 25.71 KG/M2 | WEIGHT: 160 LBS | RESPIRATION RATE: 18 BRPM | HEIGHT: 66 IN | DIASTOLIC BLOOD PRESSURE: 94 MMHG | OXYGEN SATURATION: 100 % | TEMPERATURE: 97.7 F

## 2018-04-06 DIAGNOSIS — Z11.4 SCREENING FOR HIV WITHOUT PRESENCE OF RISK FACTORS: ICD-10-CM

## 2018-04-06 DIAGNOSIS — Z12.39 ENCOUNTER FOR SCREENING FOR MALIGNANT NEOPLASM OF BREAST: ICD-10-CM

## 2018-04-06 DIAGNOSIS — E55.9 VITAMIN D DEFICIENCY: ICD-10-CM

## 2018-04-06 DIAGNOSIS — Z13.21 SCREENING FOR ENDOCRINE, NUTRITIONAL, METABOLIC AND IMMUNITY DISORDER: ICD-10-CM

## 2018-04-06 DIAGNOSIS — Z13.29 SCREENING FOR ENDOCRINE, NUTRITIONAL, METABOLIC AND IMMUNITY DISORDER: ICD-10-CM

## 2018-04-06 DIAGNOSIS — F17.210 CIGARETTE NICOTINE DEPENDENCE WITHOUT COMPLICATION: ICD-10-CM

## 2018-04-06 DIAGNOSIS — R03.0 ELEVATED BP WITHOUT DIAGNOSIS OF HYPERTENSION: ICD-10-CM

## 2018-04-06 DIAGNOSIS — Z13.0 SCREENING FOR ENDOCRINE, NUTRITIONAL, METABOLIC AND IMMUNITY DISORDER: ICD-10-CM

## 2018-04-06 DIAGNOSIS — Z00.00 ADULT GENERAL MEDICAL EXAMINATION: Primary | ICD-10-CM

## 2018-04-06 DIAGNOSIS — Z13.228 SCREENING FOR ENDOCRINE, NUTRITIONAL, METABOLIC AND IMMUNITY DISORDER: ICD-10-CM

## 2018-04-06 DIAGNOSIS — F41.9 ANXIETY: ICD-10-CM

## 2018-04-06 RX ORDER — IBUPROFEN 200 MG
1 TABLET ORAL EVERY 24 HOURS
Qty: 30 PATCH | Refills: 5 | Status: SHIPPED | OUTPATIENT
Start: 2018-04-06 | End: 2019-07-02 | Stop reason: SDUPTHER

## 2018-04-06 RX ORDER — BUSPIRONE HYDROCHLORIDE 15 MG/1
15 TABLET ORAL 2 TIMES DAILY
Qty: 60 TAB | Refills: 11 | Status: SHIPPED | OUTPATIENT
Start: 2018-04-06 | End: 2019-05-28 | Stop reason: SDUPTHER

## 2018-04-06 NOTE — MR AVS SNAPSHOT
[address] [place] [address] Chaz Hastings [address] 
[phone] Patient: Nick Argueta MRN: [id number] XYX:[id number]/[id number] Visit Information Date & Time Provider Department Dept. Phone Encounter #  
 4/6/2018  9:00 AM Teo Cervantes NP [address] [facility] [phone] [id number] Follow-up Instructions Return in about 6 months (around 10/6/2018) for elevated BP, 6 month f/u. Upcoming Health Maintenance Date Due  
 PAP AKA CERVICAL CYTOLOGY 5/7/1992 DTaP/Tdap/Td series (2 - Td) 12/14/2025 Allergies as of 4/6/2018  Review Complete On: 4/6/2018 By: Teo Cervantes NP No Known Allergies Current Immunizations  Reviewed on 1/12/2017 Name Date Influenza Vaccine Intradermal PF 12/14/2015 Pneumococcal Polysaccharide (PPSV-23) 1/12/2017 Tdap 12/14/2015 Not reviewed this visit You Were Diagnosed With   
  
 Codes Comments Adult general medical examination    -  Primary ICD-10-CM: Z00.00 ICD-9-CM: V70.9 Cigarette nicotine dependence without complication     BMW-08-IO: F17.210 ICD-9-CM: 305.1 Screening for HIV without presence of risk factors     ICD-10-CM: Z11.4 ICD-9-CM: V73.89 Screening for endocrine, nutritional, metabolic and immunity disorder     ICD-10-CM: Z13.29, Z13.21, Z13.228, Z13.0 ICD-9-CM: V77.99 Encounter for screening for malignant neoplasm of breast     ICD-10-CM: Z12.31 
ICD-9-CM: V76.10 Elevated BP without diagnosis of hypertension     ICD-10-CM: R03.0 ICD-9-CM: 796.2 Vitamin D deficiency     ICD-10-CM: E55.9 ICD-9-CM: 268.9 Vitals BP Pulse Temp Resp Height(growth percentile) Weight(growth percentile) (!) 134/99 (BP 1 Location: Left arm, BP Patient Position: Sitting) 81 97.7 °F (36.5 °C) (Oral) 18 5' 6\" (1.676 m) 160 lb (72.6 kg) SpO2 BMI OB Status Smoking Status 100% 25.82 kg/m2 Hysterectomy Current Every Day Smoker Vitals History BMI and BSA Data Body Mass Index Body Surface Area  
 25.82 kg/m 2 1.84 m 2 Preferred Pharmacy Pharmacy Name Phone [id number] [address] [facility] [address] [phone] Your Updated Medication List  
  
   
This list is accurate as of 18 10:50 AM.  Always use your most recent med list.  
  
  
  
  
 busPIRone 15 mg tablet Commonly known as:  BUSPAR  
TAKE 1 TABLET BY MOUTH TWICE DAILY-ON WEEK TWO TAKE THREE TIMES DAILY Cholecalciferol (Vitamin D3) 2,000 unit Cap capsule Commonly known as:  VITAMIN D3 Take 2,000 Units by mouth two (2) times a day. Indications: VITAMIN D DEFICIENCY (HIGH DOSE THERAPY)  
  
 fluticasone 0.05 % topical cream  
Commonly known as:  CUTIVATE  
apply to affected area twice a day  
  
 nicotine 14 mg/24 hr patch Commonly known as:  NICODERM CQ  
1 Patch by TransDERmal route every twenty-four (24) hours. Prescriptions Sent to Pharmacy Refills  
 nicotine (NICODERM CQ) 14 mg/24 hr patch 5 Si Patch by TransDERmal route every twenty-four (24) hours. Class: Normal  
 Pharmacy: [facility] [address] Ph #: [phone] Route: TransDERmal  
  
We Performed the Following CBC W/O DIFF [46966 CPT(R)] HIV 1/2 AG/AB, 4TH GENERATION,W RFLX CONFIRM [id number] CPT(R)] METABOLIC PANEL, BASIC [84883 CPT(R)] VITAMIN D, 25 HYDROXY [id number] CPT(R)] Follow-up Instructions Return in about 6 months (around 10/6/2018) for elevated BP, 6 month f/u. To-Do List   
 2018 Imaging:  [facility] MAMMO BI SCREENING INCL CAD Patient Instructions Elevated Blood Pressure: Care Instructions Your Care Instructions Blood pressure is a measure of how hard the blood pushes against the walls of your arteries. It's normal for blood pressure to go up and down throughout the day.  But if it stays up over time, you have high blood pressure. Two numbers tell you your blood pressure. The first number is the systolic pressure. It shows how hard the blood pushes when your heart is pumping. The second number is the diastolic pressure. It shows how hard the blood pushes between heartbeats, when your heart is relaxed and filling with blood. An ideal blood pressure in adults is less than 120/80 (say \"120 over 80\"). High blood pressure is 140/90 or higher. You have high blood pressure if your top number is 140 or higher or your bottom number is 90 or higher, or both. The main test for high blood pressure is simple, fast, and painless. To diagnose high blood pressure, your doctor will test your blood pressure at different times. After testing your blood pressure, your doctor may ask you to test it again when you are home. If you are diagnosed with high blood pressure, you can work with your doctor to make a long-term plan to manage it. Follow-up care is a key part of your treatment and safety. Be sure to make and go to all appointments, and call your doctor if you are having problems. It's also a good idea to know your test results and keep a list of the medicines you take. How can you care for yourself at home? · Do not smoke. Smoking increases your risk for heart attack and stroke. If you need help quitting, talk to your doctor about stop-smoking programs and medicines. These can increase your chances of quitting for good. · Stay at a healthy weight. · Try to limit how much sodium you eat to less than 2,300 milligrams (mg) a day. Your doctor may ask you to try to eat less than 1,500 mg a day. · Be physically active. Get at least 30 minutes of exercise on most days of the week. Walking is a good choice. You also may want to do other activities, such as running, swimming, cycling, or playing tennis or team sports. · Avoid or limit alcohol. Talk to your doctor about whether you can drink any alcohol. · Eat plenty of fruits, vegetables, and low-fat dairy products. Eat less saturated and total fats. · Learn how to check your blood pressure at home. When should you call for help? Call your doctor now or seek immediate medical care if: 
? · Your blood pressure is much higher than normal (such as 180/110 or higher). ? · You think high blood pressure is causing symptoms such as: ¨ Severe headache. ¨ Blurry vision. ? Watch closely for changes in your health, and be sure to contact your doctor if: 
? · You do not get better as expected. Where can you learn more? Go to http://laurie-cherri.info/. Enter F937 in the search box to learn more about \"Elevated Blood Pressure: Care Instructions. \" Current as of: September 21, 2016 Content Version: 11.4 © 7534-3078 [employer]. Care instructions adapted under license by [employer] (which disclaims liability or warranty for this information). If you have questions about a medical condition or this instruction, always ask your healthcare professional. [place] [address] any warranty or liability for your use of this information. Well Visit, Ages 25 to 48: Care Instructions Your Care Instructions Physical exams can help you stay healthy. Your doctor has checked your overall health and may have suggested ways to take good care of yourself. He or she also may have recommended tests. At home, you can help prevent illness with healthy eating, regular exercise, and other steps. Follow-up care is a key part of your treatment and safety. Be sure to make and go to all appointments, and call your doctor if you are having problems. It's also a good idea to know your test results and keep a list of the medicines you take. How can you care for yourself at home? · Reach and stay at a healthy weight. This will lower your risk for many problems, such as obesity, diabetes, heart disease, and high blood pressure. · Get at least 30 minutes of physical activity on most days of the week. Walking is a good choice. You also may want to do other activities, such as running, swimming, cycling, or playing tennis or team sports. Discuss any changes in your exercise program with your doctor. · Do not smoke or allow others to smoke around you. If you need help quitting, talk to your doctor about stop-smoking programs and medicines. These can increase your chances of quitting for good. · Talk to your doctor about whether you have any risk factors for sexually transmitted infections (STIs). Having one sex partner (who does not have STIs and does not have sex with anyone else) is a good way to avoid these infections. · Use birth control if you do not want to have children at this time. Talk with your doctor about the choices available and what might be best for you. · Protect your skin from too much sun. When you're outdoors from 10 a.m. to 4 p.m., stay in the shade or cover up with clothing and a hat with a wide brim. Wear sunglasses that block UV rays. Even when it's cloudy, put broad-spectrum sunscreen (SPF 30 or higher) on any exposed skin. · See a dentist one or two times a year for checkups and to have your teeth cleaned. · Wear a seat belt in the car. · Drink alcohol in moderation, if at all. That means no more than 2 drinks a day for men and 1 drink a day for women. Follow your doctor's advice about when to have certain tests. These tests can spot problems early. For everyone · Cholesterol. Have the fat (cholesterol) in your blood tested after age 21. Your doctor will tell you how often to have this done based on your age, family history, or other things that can increase your risk for heart disease. · Blood pressure. Have your blood pressure checked during a routine doctor visit. Your doctor will tell you how often to check your blood pressure based on your age, your blood pressure results, and other factors. · Vision. Talk with your doctor about how often to have a glaucoma test. 
· Diabetes. Ask your doctor whether you should have tests for diabetes. · Colon cancer. Have a test for colon cancer at age 48. You may have one of several tests. If you are younger than 48, you may need a test earlier if you have any risk factors. Risk factors include whether you already had a precancerous polyp removed from your colon or whether your parent, brother, sister, or child has had colon cancer. For women · Breast exam and mammogram. Talk to your doctor about when you should have a clinical breast exam and a mammogram. Medical experts differ on whether and how often women under 50 should have these tests. Your doctor can help you decide what is right for you. · Pap test and pelvic exam. Begin Pap tests at age 24. A Pap test is the best way to find cervical cancer. The test often is part of a pelvic exam. Ask how often to have this test. 
· Tests for sexually transmitted infections (STIs). Ask whether you should have tests for STIs. You may be at risk if you have sex with more than one person, especially if your partners do not wear condoms. For men · Tests for sexually transmitted infections (STIs). Ask whether you should have tests for STIs. You may be at risk if you have sex with more than one person, especially if you do not wear a condom. · Testicular cancer exam. Ask your doctor whether you should check your testicles regularly. · Prostate exam. Talk to your doctor about whether you should have a blood test (called a PSA test) for prostate cancer. Experts differ on whether and when men should have this test. Some experts suggest it if you are older than 39 and are -American or have a father or brother who got prostate cancer when he was younger than 72. When should you call for help?  
Watch closely for changes in your health, and be sure to contact your doctor if you have any problems or symptoms that concern you. Where can you learn more? Go to http://laurie-cherri.info/. Enter P072 in the search box to learn more about \"Well Visit, Ages 25 to 48: Care Instructions. \" Current as of: May 12, 2017 Content Version: 11.4 © 7263-8444 fav.or.it. Care instructions adapted under license by [employer] (which disclaims liability or warranty for this information). If you have questions about a medical condition or this instruction, always ask your healthcare professional. [place] [address] any warranty or liability for your use of this information. Learning About Benefits From Quitting Smoking How does quitting smoking make you healthier? If you're thinking about quitting smoking, you may have a few reasons to be smoke-free. Your health may be one of them. · When you quit smoking, you lower your risks for cancer, lung disease, heart attack, stroke, blood vessel disease, and blindness from macular degeneration. · When you're smoke-free, you get sick less often, and you heal faster. You are less likely to get colds, flu, bronchitis, and pneumonia. · As a nonsmoker, you may find that your mood is better and you are less stressed. When and how will you feel healthier? Quitting has real health benefits that start from day 1 of being smoke-free. And the longer you stay smoke-free, the healthier you get and the better you feel. The first hours · After just 20 minutes, your blood pressure and heart rate go down. That means there's less stress on your heart and blood vessels. · Within 12 hours, the level of carbon monoxide in your blood drops back to normal. That makes room for more oxygen. With more oxygen in your body, you may notice that you have more energy than when you smoked. After 2 weeks · Your lungs start to work better. · Your risk of heart attack starts to drop. After 1 month · When your lungs are clear, you cough less and breathe deeper, so it's easier to be active. · Your sense of taste and smell return. That means you can enjoy food more than you have since you started smoking. Over the years · After 1 year, your risk of heart disease is half what it would be if you kept smoking. · After 5 years, your risk of stroke starts to shrink. Within a few years after that, it's about the same as if you'd never smoked. · After 10 years, your risk of dying from lung cancer is cut by about half. And your risk for many other types of cancer is lower too. How would quitting help others in your life? When you quit smoking, you improve the health of everyone who now breathes in your smoke. · Their heart, lung, and cancer risks drop, much like yours. · They are sick less. For babies and small children, living smoke-free means they're less likely to have ear infections, pneumonia, and bronchitis. · If you're a woman who is or will be pregnant someday, quitting smoking means a healthier . · Children who are close to you are less likely to become adult smokers. Where can you learn more? Go to http://lauriecharming charliecherri.info/. Enter [id number] [id number] [id number] 11 in the search box to learn more about \"Learning About Benefits From Quitting Smoking. \" Current as of: 2017 Content Version: 11.4 © 3724-1260 [employer]. Care instructions adapted under license by [employer] (which disclaims liability or warranty for this information). If you have questions about a medical condition or this instruction, always ask your healthcare professional. [place] [address] any warranty or liability for your use of this information. Mammogram: About This Test 
What is it? A mammogram is an X-ray of the breast that is used to screen for breast cancer.  This test can find tumors that are too small for you or your doctor to feel. Cancer is most easily treated and cured when it is found at an early stage. Why is this test done? A mammogram is done to: 
· Look for breast cancer in women who don't have symptoms. · Find breast cancer in women who have symptoms. Symptoms of breast cancer may include a lump or thickening in the breast, nipple discharge, or dimpling of the skin on one area of the breast. 
· Find an area of suspicious breast tissue to remove for an exam under a microscope (biopsy). How can you prepare for the test? 
· Tell your doctor if you: ¨ Are or might be pregnant. ¨ Are breastfeeding. ¨ Have breast implants. ¨ Have previously had a breast biopsy. · On the day of the test, don't use any deodorant, perfume, powders, or ointments. What happens before the test? 
· You will need to take off any jewelry that might interfere with the X-ray pictures. · You will need to take off your clothes above the waist. 
· You will be given a cloth or paper gown to use during the test. 
What happens during the test? 
· You usually stand during a mammogram. 
· One at a time, your breasts will be placed on a flat plate that contains the X-ray film. · Another plate is then pressed firmly against your breast to help flatten out the breast tissue. You may be asked to lift your arm. · For a few seconds while the X-ray picture is being taken, you will need to hold your breath. · At least two pictures are taken of each breast. One is taken from the top and one from the side. What else should you know about the test? 
· The X-ray plate will feel cold when you place your breast on it. Having your breasts flattened and squeezed isn't comfortable. But it is necessary to flatten out the breast tissue to get the best pictures. · Mammograms do not prevent breast cancer or reduce a woman's risk of developing cancer. · Most things that are found during a mammogram are not breast cancer. How long does the test take? · The test will take about 10 to 15 minutes. You may be in the clinic for up to an hour. What happens after the test? 
· You will probably be able to go home right away. · You can go back to your usual activities right away. Follow-up care is a key part of your treatment and safety. Be sure to make and go to all appointments, and call your doctor if you are having problems. It's also a good idea to keep a list of the medicines you take. Ask your doctor when you can expect to have your test results. Where can you learn more? Go to http://laurie-cherri.info/. Enter H920 in the search box to learn more about \"Mammogram: About This Test.\" Current as of: May 12, 2017 Content Version: 11.4 © 6309-5389 [employer]wise[employer]. Care instructions adapted under license by [employer] (which disclaims liability or warranty for this information). If you have questions about a medical condition or this instruction, always ask your healthcare professional. [address] any warranty or liability for your use of this information. Introducing \[facility]\"" & HEALTH SERVICES! Klarissa Wyman introduces [employer] patient portal. Now you can access parts of your medical record, email your doctor's office, and request medication refills online. 1. In your internet browser, go to [email or web address] 2. Click on the First Time User? Click Here link in the Sign In box. You will see the New Member Sign Up page. 3. Enter your [employer] Access Code exactly as it appears below. You will not need to use this code after youve completed the sign-up process. If you do not sign up before the expiration date, you must request a new code. · [employer] Access Code: [email or web address] Expires: 7/5/2018  8:59 AM 
 
4. Enter the last four digits of your Social Security Number (xxxx) and Date of Birth (mm/dd/yyyy) as indicated and click Submit.  You will be taken to the next sign-up page. 5. Create a TrustTeam ID. This will be your TrustTeam login ID and cannot be changed, so think of one that is secure and easy to remember. 6. Create a TrustTeam password. You can change your password at any time. 7. Enter your Password Reset Question and Answer. This can be used at a later time if you forget your password. 8. Enter your e-mail address. You will receive e-mail notification when new information is available in [address]. 9. Click Sign Up. You can now view and download portions of your medical record. 10. Click the Download Summary menu link to download a portable copy of your medical information. If you have questions, please visit the Frequently Asked Questions section of the TrustTeam website. Remember, TrustTeam is NOT to be used for urgent needs. For medical emergencies, dial 911. Now available from your iPhone and Android! Please provide this summary of care documentation to your next provider. Your primary care clinician is listed as LEW Swift. If you have any questions after today's visit, please call [phone].

## 2018-04-06 NOTE — PROGRESS NOTES
Junior Hooper is a 56 y.o. female and presents with Complete Physical    Subjective: Junior Hooper is a 56 y.o. female presenting for annual checkup. ROS: Feeling well. No dyspnea or chest pain on exertion. No abdominal pain, change in bowel habits, black or bloody stools. No urinary tract or prostatic symptoms. No neurological complaints. Specific concerns today: none. Discussed ADVANCED DIRECTIVE:yes  Advanced Directive on File: no  Last BM: yesterday  Dental exam in past 12 months: no  Eye exam in past 12 months: yes, needs bifocals. Review of Systems  Constitutional: negative for fevers, chills, anorexia and weight loss  Eyes:   Wears reading glasses. negative for visual disturbance, drainage, and irritation  ENT:   negative for tinnitus,sore throat,nasal congestion,ear pain,and hoarseness  Respiratory:  negative for cough, hemoptysis, dyspnea, and wheezing  CV:   negative for chest pain, palpitations, and lower extremity edema  GI:   negative for nausea, vomiting, diarrhea, abdominal pain, and melena  Endo:               negative for polyuria,polydipsia,polyphagia, and heat intolerance  Genitourinary: negative for frequency, urgency, dysuria, retention, and hematuria  Integument:  negative for rash, ulcerations, and pruritus  Hematologic:  negative for easy bruising and bleeding  Musculoskel: negative for arthralgias, muscle weakness,and joint pain/swelling  Neurological:  negative for headaches, dizziness, vertigo,and memory/gait problems  Behavl/Psych: negative for feelings of anxiety, depression, suicide, and mood changes    History reviewed. No pertinent past medical history.   Past Surgical History:   Procedure Laterality Date    HX PARTIAL HYSTERECTOMY       Social History     Social History    Marital status: SINGLE     Spouse name: N/A    Number of children: N/A    Years of education: N/A     Social History Main Topics    Smoking status: Current Every Day Smoker    Smokeless tobacco: Never Used    Alcohol use 0.0 oz/week     0 Standard drinks or equivalent per week      Comment: social    Drug use: No    Sexual activity: Yes     Partners: Male     Birth control/ protection: None     Other Topics Concern    None     Social History Narrative     Family History   Problem Relation Age of Onset    Cancer Mother      cancer of the blood    Diabetes Maternal Aunt      Current Outpatient Prescriptions   Medication Sig Dispense Refill    busPIRone (BUSPAR) 15 mg tablet TAKE 1 TABLET BY MOUTH TWICE DAILY-ON WEEK TWO TAKE THREE TIMES DAILY 60 Tab 0    nicotine (NICODERM CQ) 14 mg/24 hr patch APPLY ONE PATCH TRANSDERMALLY EVERY 24 HOURS FOR 30 DAYS 28 Patch 2    fluticasone (CUTIVATE) 0.05 % topical cream apply to affected area twice a day 30 g 1    Cholecalciferol, Vitamin D3, (VITAMIN D3) 2,000 unit cap capsule Take 2,000 Units by mouth two (2) times a day. Indications: VITAMIN D DEFICIENCY (HIGH DOSE THERAPY) 60 Cap 11     No Known Allergies    Objective:  Visit Vitals    BP (!) 134/99 (BP 1 Location: Left arm, BP Patient Position: Sitting)    Pulse 81    Temp 97.7 °F (36.5 °C) (Oral)    Resp 18    Ht 5' 6\" (1.676 m)    Wt 160 lb (72.6 kg)    SpO2 100%    BMI 25.82 kg/m2     Wt Readings from Last 3 Encounters:   04/06/18 160 lb (72.6 kg)   01/12/17 172 lb (78 kg)   04/07/16 164 lb (74.4 kg)     Physical Exam:   General appearance - alert, well appearing, and in no distress. Mental status - A/O x 4, normal mood and affect. Head/Eyes- AT/NC. EDUARDO, EOMI, corneas normal, no foreign bodies. Ears- TM intact bilaterally, no erythema or drainage. Nose- Septum midline, pink mucosa. Turbinates boggy and pink, no polyps or erythema. No sinus tenderness. Mouth/Throat - mucous membranes moist, pharynx normal without lesions. No tonsillar swelling or exudates. Neck -Supple ,normal CSP. FROM, non-tender. No adenopathy/thyromegaly. No JVD.   Breast- no masses, lumps, or nipple discharge. Chest - CTA. Symmetric chest rise. No wheezing, rales or rhonchi. Heart - Normal rate, regular rhythm. Normal S1, S2. No MGR. Abdomen - Soft,non-distended. Normoactive BS in all quadrants. NT, no mass, rebound, or HSM   Ext- Radial, DP pulses, 2+ bilaterally. No pedal edema, clubbing, or cyanosis. Skin- Normal for ethnicity, warm, and dry. No hyperpigmentation, ulcerations, or suspicious lesions  Neuro - Normal speech, no focal findings. Normal strength and muscle tone. Coordination and gait normal.    CN II-XII intact. Cold and vibratory sensation intact. Normal DTR's. Assessment/Plan:  Labs ordered. Smoking cessation discussed for 3-10 minutes, pt planning to use patches again and avoid drinking in evenings following work, meds today. Medication Side Effects and Warnings were discussed with patient: yes   Patient Labs were reviewed: yes  Patient Past Records were reviewed: yes  See orders below    Pt has given consent verbally while in office for [employer] messaging. ICD-10-CM ICD-9-CM    1. Cigarette nicotine dependence without complication O32.791 487.0 nicotine (NICODERM CQ) 14 mg/24 hr patch     No orders of the defined types were placed in this encounter. Follow-up Disposition: Not on File    Rain Mcduffie expressed understanding of plan. An After Visit Summary was offered/printed and given to the patient.

## 2018-04-06 NOTE — PROGRESS NOTES
Verbal order given by Escobar Pickett for BP repeat. .. Pt was Educated on low sodium diet. Follow up in 6 month for BP. . Will readdress at this visit to determine the need for medication    BP Readings from Last 3 Encounters:   04/06/18 (!) 145/94   01/12/17 122/89   04/07/16 109/88

## 2018-04-06 NOTE — PROGRESS NOTES
Sveta Frederick is a 56 y.o. female    Chief Complaint   Patient presents with    Complete Physical     1. Have you been to the ER, urgent care clinic since your last visit? Hospitalized since your last visit? No    2. Have you seen or consulted any other health care providers outside of the [address] [place] [address] since your last visit? Include any pap smears or colon screening. No     Patient denies pain at this time.

## 2018-04-06 NOTE — PATIENT INSTRUCTIONS
Elevated Blood Pressure: Care Instructions  Your Care Instructions    Blood pressure is a measure of how hard the blood pushes against the walls of your arteries. It's normal for blood pressure to go up and down throughout the day. But if it stays up over time, you have high blood pressure. Two numbers tell you your blood pressure. The first number is the systolic pressure. It shows how hard the blood pushes when your heart is pumping. The second number is the diastolic pressure. It shows how hard the blood pushes between heartbeats, when your heart is relaxed and filling with blood. An ideal blood pressure in adults is less than 120/80 (say \"120 over 80\"). High blood pressure is 140/90 or higher. You have high blood pressure if your top number is 140 or higher or your bottom number is 90 or higher, or both. The main test for high blood pressure is simple, fast, and painless. To diagnose high blood pressure, your doctor will test your blood pressure at different times. After testing your blood pressure, your doctor may ask you to test it again when you are home. If you are diagnosed with high blood pressure, you can work with your doctor to make a long-term plan to manage it. Follow-up care is a key part of your treatment and safety. Be sure to make and go to all appointments, and call your doctor if you are having problems. It's also a good idea to know your test results and keep a list of the medicines you take. How can you care for yourself at home? · Do not smoke. Smoking increases your risk for heart attack and stroke. If you need help quitting, talk to your doctor about stop-smoking programs and medicines. These can increase your chances of quitting for good. · Stay at a healthy weight. · Try to limit how much sodium you eat to less than 2,300 milligrams (mg) a day. Your doctor may ask you to try to eat less than 1,500 mg a day. · Be physically active.  Get at least 30 minutes of exercise on most days of the week. Walking is a good choice. You also may want to do other activities, such as running, swimming, cycling, or playing tennis or team sports. · Avoid or limit alcohol. Talk to your doctor about whether you can drink any alcohol. · Eat plenty of fruits, vegetables, and low-fat dairy products. Eat less saturated and total fats. · Learn how to check your blood pressure at home. When should you call for help? Call your doctor now or seek immediate medical care if:  ? · Your blood pressure is much higher than normal (such as 180/110 or higher). ? · You think high blood pressure is causing symptoms such as:  ¨ Severe headache. ¨ Blurry vision. ? Watch closely for changes in your health, and be sure to contact your doctor if:  ? · You do not get better as expected. Where can you learn more? Go to http://laurieComuni-Chiamocherri.info/. Enter I319 in the search box to learn more about \"Elevated Blood Pressure: Care Instructions. \"  Current as of: September 21, 2016  Content Version: 11.4  © 5976-2190 [employer]. Care instructions adapted under license by [employer] (which disclaims liability or warranty for this information). If you have questions about a medical condition or this instruction, always ask your healthcare professional. [address] any warranty or liability for your use of this information. Well Visit, Ages 25 to 48: Care Instructions  Your Care Instructions    Physical exams can help you stay healthy. Your doctor has checked your overall health and may have suggested ways to take good care of yourself. He or she also may have recommended tests. At home, you can help prevent illness with healthy eating, regular exercise, and other steps. Follow-up care is a key part of your treatment and safety. Be sure to make and go to all appointments, and call your doctor if you are having problems.  It's also a good idea to know your test results and keep a list of the medicines you take. How can you care for yourself at home? · Reach and stay at a healthy weight. This will lower your risk for many problems, such as obesity, diabetes, heart disease, and high blood pressure. · Get at least 30 minutes of physical activity on most days of the week. Walking is a good choice. You also may want to do other activities, such as running, swimming, cycling, or playing tennis or team sports. Discuss any changes in your exercise program with your doctor. · Do not smoke or allow others to smoke around you. If you need help quitting, talk to your doctor about stop-smoking programs and medicines. These can increase your chances of quitting for good. · Talk to your doctor about whether you have any risk factors for sexually transmitted infections (STIs). Having one sex partner (who does not have STIs and does not have sex with anyone else) is a good way to avoid these infections. · Use birth control if you do not want to have children at this time. Talk with your doctor about the choices available and what might be best for you. · Protect your skin from too much sun. When you're outdoors from 10 a.m. to 4 p.m., stay in the shade or cover up with clothing and a hat with a wide brim. Wear sunglasses that block UV rays. Even when it's cloudy, put broad-spectrum sunscreen (SPF 30 or higher) on any exposed skin. · See a dentist one or two times a year for checkups and to have your teeth cleaned. · Wear a seat belt in the car. · Drink alcohol in moderation, if at all. That means no more than 2 drinks a day for men and 1 drink a day for women. Follow your doctor's advice about when to have certain tests. These tests can spot problems early. For everyone  · Cholesterol. Have the fat (cholesterol) in your blood tested after age 21.  Your doctor will tell you how often to have this done based on your age, family history, or other things that can increase your risk for heart disease. · Blood pressure. Have your blood pressure checked during a routine doctor visit. Your doctor will tell you how often to check your blood pressure based on your age, your blood pressure results, and other factors. · Vision. Talk with your doctor about how often to have a glaucoma test.  · Diabetes. Ask your doctor whether you should have tests for diabetes. · Colon cancer. Have a test for colon cancer at age 48. You may have one of several tests. If you are younger than 48, you may need a test earlier if you have any risk factors. Risk factors include whether you already had a precancerous polyp removed from your colon or whether your parent, brother, sister, or child has had colon cancer. For women  · Breast exam and mammogram. Talk to your doctor about when you should have a clinical breast exam and a mammogram. Medical experts differ on whether and how often women under 50 should have these tests. Your doctor can help you decide what is right for you. · Pap test and pelvic exam. Begin Pap tests at age 24. A Pap test is the best way to find cervical cancer. The test often is part of a pelvic exam. Ask how often to have this test.  · Tests for sexually transmitted infections (STIs). Ask whether you should have tests for STIs. You may be at risk if you have sex with more than one person, especially if your partners do not wear condoms. For men  · Tests for sexually transmitted infections (STIs). Ask whether you should have tests for STIs. You may be at risk if you have sex with more than one person, especially if you do not wear a condom. · Testicular cancer exam. Ask your doctor whether you should check your testicles regularly. · Prostate exam. Talk to your doctor about whether you should have a blood test (called a PSA test) for prostate cancer.  Experts differ on whether and when men should have this test. Some experts suggest it if you are older than 39 and are -American or have a father or brother who got prostate cancer when he was younger than 72. When should you call for help? Watch closely for changes in your health, and be sure to contact your doctor if you have any problems or symptoms that concern you. Where can you learn more? Go to http://laurie-cherri.info/. Enter P072 in the search box to learn more about \"Well Visit, Ages 25 to 48: Care Instructions. \"  Current as of: May 12, 2017  Content Version: 11.4  © 7856-9420 [employer]. Care instructions adapted under license by [employer] (which disclaims liability or warranty for this information). If you have questions about a medical condition or this instruction, always ask your healthcare professional. [address] any warranty or liability for your use of this information. Learning About Benefits From Quitting Smoking  How does quitting smoking make you healthier? If you're thinking about quitting smoking, you may have a few reasons to be smoke-free. Your health may be one of them. · When you quit smoking, you lower your risks for cancer, lung disease, heart attack, stroke, blood vessel disease, and blindness from macular degeneration. · When you're smoke-free, you get sick less often, and you heal faster. You are less likely to get colds, flu, bronchitis, and pneumonia. · As a nonsmoker, you may find that your mood is better and you are less stressed. When and how will you feel healthier? Quitting has real health benefits that start from day 1 of being smoke-free. And the longer you stay smoke-free, the healthier you get and the better you feel. The first hours  · After just 20 minutes, your blood pressure and heart rate go down. That means there's less stress on your heart and blood vessels. · Within 12 hours, the level of carbon monoxide in your blood drops back to normal. That makes room for more oxygen.  With more oxygen in your body, you may notice that you have more energy than when you smoked. After 2 weeks  · Your lungs start to work better. · Your risk of heart attack starts to drop. After 1 month  · When your lungs are clear, you cough less and breathe deeper, so it's easier to be active. · Your sense of taste and smell return. That means you can enjoy food more than you have since you started smoking. Over the years  · After 1 year, your risk of heart disease is half what it would be if you kept smoking. · After 5 years, your risk of stroke starts to shrink. Within a few years after that, it's about the same as if you'd never smoked. · After 10 years, your risk of dying from lung cancer is cut by about half. And your risk for many other types of cancer is lower too. How would quitting help others in your life? When you quit smoking, you improve the health of everyone who now breathes in your smoke. · Their heart, lung, and cancer risks drop, much like yours. · They are sick less. For babies and small children, living smoke-free means they're less likely to have ear infections, pneumonia, and bronchitis. · If you're a woman who is or will be pregnant someday, quitting smoking means a healthier . · Children who are close to you are less likely to become adult smokers. Where can you learn more? Go to http://laurie-cherri.info/. Enter [id number] [id number] [id number] in the search box to learn more about \"Learning About Benefits From Quitting Smoking. \"  Current as of: 2017  Content Version: 11.4  © 6817-4046 [employer]wise[employer]. Care instructions adapted under license by [employer] (which disclaims liability or warranty for this information). If you have questions about a medical condition or this instruction, always ask your healthcare professional. [place] [address] any warranty or liability for your use of this information. Mammogram: About This Test  What is it?   A mammogram is an X-ray of the breast that is used to screen for breast cancer. This test can find tumors that are too small for you or your doctor to feel. Cancer is most easily treated and cured when it is found at an early stage. Why is this test done? A mammogram is done to:  · Look for breast cancer in women who don't have symptoms. · Find breast cancer in women who have symptoms. Symptoms of breast cancer may include a lump or thickening in the breast, nipple discharge, or dimpling of the skin on one area of the breast.  · Find an area of suspicious breast tissue to remove for an exam under a microscope (biopsy). How can you prepare for the test?  · Tell your doctor if you:  ¨ Are or might be pregnant. ¨ Are breastfeeding. ¨ Have breast implants. ¨ Have previously had a breast biopsy. · On the day of the test, don't use any deodorant, perfume, powders, or ointments. What happens before the test?  · You will need to take off any jewelry that might interfere with the X-ray pictures. · You will need to take off your clothes above the waist.  · You will be given a cloth or paper gown to use during the test.  What happens during the test?  · You usually stand during a mammogram.  · One at a time, your breasts will be placed on a flat plate that contains the X-ray film. · Another plate is then pressed firmly against your breast to help flatten out the breast tissue. You may be asked to lift your arm. · For a few seconds while the X-ray picture is being taken, you will need to hold your breath. · At least two pictures are taken of each breast. One is taken from the top and one from the side. What else should you know about the test?  · The X-ray plate will feel cold when you place your breast on it. Having your breasts flattened and squeezed isn't comfortable. But it is necessary to flatten out the breast tissue to get the best pictures.   · Mammograms do not prevent breast cancer or reduce a woman's risk of developing cancer. · Most things that are found during a mammogram are not breast cancer. How long does the test take? · The test will take about 10 to 15 minutes. You may be in the clinic for up to an hour. What happens after the test?  · You will probably be able to go home right away. · You can go back to your usual activities right away. Follow-up care is a key part of your treatment and safety. Be sure to make and go to all appointments, and call your doctor if you are having problems. It's also a good idea to keep a list of the medicines you take. Ask your doctor when you can expect to have your test results. Where can you learn more? Go to http://laurie-cherri.info/. Enter N673 in the search box to learn more about \"Mammogram: About This Test.\"  Current as of: May 12, 2017  Content Version: 11.4  © 8906-5197 Healthwise, Incorporated. Care instructions adapted under license by [employer] (which disclaims liability or warranty for this information). If you have questions about a medical condition or this instruction, always ask your healthcare professional. [address] any warranty or liability for your use of this information.

## 2018-04-07 LAB
25(OH)D3+25(OH)D2 SERPL-MCNC: 13.4 NG/ML (ref 30–100)
BUN SERPL-MCNC: 13 MG/DL (ref 6–24)
BUN/CREAT SERPL: 18 (ref 9–23)
CALCIUM SERPL-MCNC: 9.5 MG/DL (ref 8.7–10.2)
CHLORIDE SERPL-SCNC: 103 MMOL/L (ref 96–106)
CO2 SERPL-SCNC: 24 MMOL/L (ref 18–29)
CREAT SERPL-MCNC: 0.71 MG/DL (ref 0.57–1)
ERYTHROCYTE [DISTWIDTH] IN BLOOD BY AUTOMATED COUNT: 13.2 % (ref 12.3–15.4)
GFR SERPLBLD CREATININE-BSD FMLA CKD-EPI: 102 ML/MIN/1.73
GFR SERPLBLD CREATININE-BSD FMLA CKD-EPI: 118 ML/MIN/1.73
GLUCOSE SERPL-MCNC: 98 MG/DL (ref 65–99)
HCT VFR BLD AUTO: 38.9 % (ref 34–46.6)
HGB BLD-MCNC: 12.4 G/DL (ref 11.1–15.9)
HIV 1+2 AB+HIV1 P24 AG SERPL QL IA: NON REACTIVE
MCH RBC QN AUTO: 28.6 PG (ref 26.6–33)
MCHC RBC AUTO-ENTMCNC: 31.9 G/DL (ref 31.5–35.7)
MCV RBC AUTO: 90 FL (ref 79–97)
PLATELET # BLD AUTO: 361 X10E3/UL (ref 150–379)
POTASSIUM SERPL-SCNC: 4.3 MMOL/L (ref 3.5–5.2)
RBC # BLD AUTO: 4.34 X10E6/UL (ref 3.77–5.28)
SODIUM SERPL-SCNC: 142 MMOL/L (ref 134–144)
WBC # BLD AUTO: 5.6 X10E3/UL (ref 3.4–10.8)

## 2018-04-09 DIAGNOSIS — E55.9 VITAMIN D DEFICIENCY: Primary | ICD-10-CM

## 2018-04-09 RX ORDER — ACETAMINOPHEN 500 MG
2000 TABLET ORAL 2 TIMES DAILY
Qty: 60 CAP | Refills: 11 | Status: SHIPPED | OUTPATIENT
Start: 2018-04-09 | End: 2021-08-18 | Stop reason: ALTCHOICE

## 2018-04-16 ENCOUNTER — HOSPITAL ENCOUNTER (OUTPATIENT)
Dept: MAMMOGRAPHY | Age: 48
Discharge: HOME | End: 2018-04-16
Attending: NURSE PRACTITIONER
Payer: MEDICAID

## 2018-04-16 DIAGNOSIS — Z12.39 ENCOUNTER FOR SCREENING FOR MALIGNANT NEOPLASM OF BREAST: ICD-10-CM

## 2018-04-16 DIAGNOSIS — Z00.00 ADULT GENERAL MEDICAL EXAMINATION: ICD-10-CM

## 2018-04-16 PROCEDURE — 77067 SCR MAMMO BI INCL CAD: CPT

## 2018-05-03 ENCOUNTER — TELEPHONE (OUTPATIENT)
Dept: INTERNAL MEDICINE CLINIC | Age: 48
End: 2018-05-03

## 2018-05-03 NOTE — TELEPHONE ENCOUNTER
Pt wants a return call ref to getting another mammogram. She states she received a call ref to this from radiology.  Pt # [phone]

## 2018-05-03 NOTE — TELEPHONE ENCOUNTER
Tried to return call. Line busy. May advise pt that there was an abnormality seen in her left breast, so more testing is needed to determine what it is. May be a cyst, dense tissue, or something more. Unsure at this time, that's why additional testing is needed.

## 2018-05-04 NOTE — TELEPHONE ENCOUNTER
Patient Seen in: [facility] Emergency Department    History   Patient presents with:  Syncope: very strong smell of smoke    Stated Complaint: Syncope    HPI    20-year-old female presents emergency department with her .   She was driving in Pt was called 5/4/18 @ 11:52 a m left voice mail message for pt with the information you gave me to give her. cm (5')  Wt 52.164 kg  BMI 22.46 kg/m2  SpO2 99%  LMP 02/07/2017        Physical Exam   Constitutional: She is oriented to person, place, and time. She appears well-developed and well-nourished. No distress. HENT:   Head: Normocephalic and atraumatic. for panel order CBC WITH DIFFERENTIAL WITH PLATELET.   Procedure                               Abnormality         Status                     ---------                               -----------         ------                     CBC W/ DIFFERENTIAL[[id number]

## 2018-06-12 ENCOUNTER — HOSPITAL ENCOUNTER (OUTPATIENT)
Dept: MAMMOGRAPHY | Age: 48
Discharge: HOME | End: 2018-06-12
Attending: NURSE PRACTITIONER
Payer: MEDICAID

## 2018-06-12 ENCOUNTER — HOSPITAL ENCOUNTER (OUTPATIENT)
Dept: ULTRASOUND IMAGING | Age: 48
Discharge: HOME | End: 2018-06-12
Attending: NURSE PRACTITIONER
Payer: MEDICAID

## 2018-06-12 DIAGNOSIS — Z87.898 HISTORY OF ABNORMAL MAMMOGRAM: ICD-10-CM

## 2018-06-12 PROCEDURE — 76642 ULTRASOUND BREAST LIMITED: CPT

## 2018-06-12 PROCEDURE — 77065 DX MAMMO INCL CAD UNI: CPT

## 2018-07-10 ENCOUNTER — CLINICAL SUPPORT (OUTPATIENT)
Dept: INTERNAL MEDICINE CLINIC | Age: 48
End: 2018-07-10

## 2018-07-10 VITALS
BODY MASS INDEX: 25.52 KG/M2 | WEIGHT: 158.8 LBS | TEMPERATURE: 97.7 F | OXYGEN SATURATION: 98 % | SYSTOLIC BLOOD PRESSURE: 134 MMHG | DIASTOLIC BLOOD PRESSURE: 100 MMHG | HEIGHT: 66 IN | HEART RATE: 114 BPM | RESPIRATION RATE: 16 BRPM

## 2018-07-10 DIAGNOSIS — Z11.1 SCREENING EXAMINATION FOR PULMONARY TUBERCULOSIS: ICD-10-CM

## 2018-07-10 DIAGNOSIS — Z23 ENCOUNTER FOR IMMUNIZATION: ICD-10-CM

## 2018-07-10 NOTE — MR AVS SNAPSHOT
Shonna Yara 
 
 
 [address] [facility] [address] [address] 
[phone] Patient: Judge Rojas MRN: [id number] Visit Information Date & Time Provider Department Dept. Phone Encounter #  
 7/10/2018  1:40 PM Zuleima Shah NP [address] [facility] [phone] [id number] Your Appointments 10/9/2018  3:00 PM  
PAP with Zuleima Shah NP  
[address]) Appt Note: elevated BP and Pap [address] [facility] [address] [address]  
[phone]  
  
   
 [address] [facility] Upcoming Health Maintenance Date Due  
 PAP AKA CERVICAL CYTOLOGY 1992 Influenza Age 5 to Adult 2018 DTaP/Tdap/Td series (2 - Td) 2025 Allergies as of 7/10/2018  Review Complete On: 7/10/2018 By: Rosana Bob LPN No Known Allergies Current Immunizations  Reviewed on 7/10/2018 Name Date Influenza Vaccine Intradermal PF 2015 Pneumococcal Polysaccharide (PPSV-23) 2017 Tdap 2015 Reviewed by Rosana Bob LPN on  at  1:47 PM  
You Were Diagnosed With   
  
 Codes Comments Encounter for immunization     ICD-10-CM: T29 ICD-9-CM: V03.89 Screening examination for pulmonary tuberculosis     ICD-10-CM: Z11.1 ICD-9-CM: V74.1 Vitals BP Pulse Temp Resp Height(growth percentile) Weight(growth percentile) (!) 134/100 (BP 1 Location: Right arm, BP Patient Position: Sitting) (!) 114 97.7 °F (36.5 °C) (Oral) 16 5' 6\" (1.676 m) 158 lb 12.8 oz (72 kg) SpO2 BMI OB Status Smoking Status 98% 25.63 kg/m2 Hysterectomy Current Every Day Smoker Vitals History BMI and BSA Data Body Mass Index Body Surface Area  
 25.63 kg/m 2 1.83 m 2 Preferred Pharmacy Pharmacy Name Phone [phone] [phone] Your Updated Medication List  
  
   
This list is accurate as of 7/10/18  1:48 PM.  Always use your most recent med list.  
  
  
  
  
 busPIRone 15 mg tablet Commonly known as:  BUSPAR Take 1 Tab by mouth two (2) times a day. Cholecalciferol (Vitamin D3) 2,000 unit Cap capsule Commonly known as:  VITAMIN D3 Take 2,000 Units by mouth two (2) times a day. Indications: VITAMIN D DEFICIENCY (HIGH DOSE THERAPY)  
  
 fluticasone 0.05 % topical cream  
Commonly known as:  CUTIVATE  
apply to affected area twice a day  
  
 nicotine 14 mg/24 hr patch Commonly known as:  NICODERM CQ  
1 Patch by TransDERmal route every twenty-four (24) hours. Introducing [facility] [place] [facility] SERVICES! [employer] introduces [employer] patient portal. Now you can access parts of your medical record, email your doctor's office, and request medication refills online. 1. In your internet browser, go to [email or web address] 2. Click on the First Time User? Click Here link in the Sign In box. You will see the New Member Sign Up page. 3. Enter your [employer] Access Code exactly as it appears below. You will not need to use this code after youve completed the sign-up process. If you do not sign up before the expiration date, you must request a new code. · [employer] Access Code: [email or web address] Expires: 10/8/2018  1:38 PM 
 
4. Enter the last four digits of your Social Security Number (xxxx) and Date of Birth (mm/dd/yyyy) as indicated and click Submit. You will be taken to the next sign-up page. 5. Create a Panjot ID. This will be your [employer] login ID and cannot be changed, so think of one that is secure and easy to remember. 6. Create a [employer] password. You can change your password at any time. 7. Enter your Password Reset Question and Answer. This can be used at a later time if you forget your password. 8. Enter your e-mail address.  You will receive e-mail notification when new information is available in [employer]. 9. Click Sign Up. You can now view and download portions of your medical record. 10. Click the Download Summary menu link to download a portable copy of your medical information. If you have questions, please visit the Frequently Asked Questions section of the [employer] website. Remember, [employer] is NOT to be used for urgent needs. For medical emergencies, dial 911. Now available from your iPhone and Android! Please provide this summary of care documentation to your next provider. Your primary care clinician is listed as LEW Myers. If you have any questions after today's visit, please call [phone].

## 2018-07-10 NOTE — LETTER
NOTIFICATION RETURN TO WORK / SCHOOL 
 
7/10/2018 1:50 PM 
 
Ms. Garo Delgado [address] [facility] [address] To Whom It May Concern: 
 
Garo Delgado is currently under the care of Marti Soriano. She will return to work/school on: 7/10/18, seen in office today. Will return on 7/12/18 for BP check and PPD READING. If there are questions or concerns please have the patient contact our office. Sincerely, David Ott, NP

## 2018-07-10 NOTE — PROGRESS NOTES
Chief Complaint   Patient presents with    Immunization/Injection     tb skin test     1. Have you been to the ER, urgent care clinic since your last visit? Hospitalized since your last visit? No    2. Have you seen or consulted any other health care providers outside of the [address] [place] [address] since your last visit? Include any pap smears or colon screening. No   PPD Placement note  Dorothy Baez, 53 y.o. female is here today for placement of PPD test  Reason for PPD test: job  Pt taken PPD test before: no  Verified in allergy area and with patient that they are not allergic to the products PPD is made of (Phenol or Tween). No:   Is patient taking any oral or IV steroid medication now or have they taken it in the last month? no  Has the patient ever received the BCG vaccine?: no  Has the patient been in recent contact with anyone known or suspected of having active TB disease?: no       Date of exposure (if applicable): n/a       Name of person they were exposed to (if applicable): n/a  Patient's Country of origin?: n/a  O: Alert and oriented in NAD. P:  PPD placed on 7/10/2018. Patient advised to return for reading within 48-72 hours.

## 2018-07-12 ENCOUNTER — CLINICAL SUPPORT (OUTPATIENT)
Dept: INTERNAL MEDICINE CLINIC | Age: 48
End: 2018-07-12

## 2018-07-12 VITALS
OXYGEN SATURATION: 94 % | TEMPERATURE: 98.2 F | SYSTOLIC BLOOD PRESSURE: 118 MMHG | WEIGHT: 158 LBS | DIASTOLIC BLOOD PRESSURE: 80 MMHG | RESPIRATION RATE: 18 BRPM | HEIGHT: 66 IN | BODY MASS INDEX: 25.39 KG/M2 | HEART RATE: 75 BPM

## 2018-07-12 DIAGNOSIS — Z11.1 ENCOUNTER FOR PPD SKIN TEST READING: Primary | ICD-10-CM

## 2018-07-12 LAB
MM INDURATION POC: 0 MM (ref 0–5)
PPD POC: NEGATIVE NEGATIVE

## 2018-07-12 NOTE — PROGRESS NOTES
PPD Reading Note  PPD read and results entered in [facility] [employer]. Result: Negative,0 mm induration.   Interpretation: Negative  If test not read within 48-72 hours of initial placement, patient advised to repeat in other arm 1-3 weeks after this test.  Allergic reaction: NO    PPD was place in pt left forearm Intradermal..       Pt is here for   Chief Complaint   Patient presents with    PPD Reading

## 2018-10-22 ENCOUNTER — TELEPHONE (OUTPATIENT)
Dept: INTERNAL MEDICINE CLINIC | Age: 48
End: 2018-10-22

## 2018-10-22 NOTE — TELEPHONE ENCOUNTER
Looks like it's cheapest at [facility] on [employer]. She should download this pearl and use it to help get her meds cheaper. She must show them the coupon from her phone or use one of the cards here in the office (her choice).

## 2018-10-22 NOTE — TELEPHONE ENCOUNTER
Pt states she used to get the Bandhappy Aid for only $10 and this pharmacy has closed .  Her rxs were transferred to [facility] and it costs $62. Pt # [phone]

## 2018-10-23 NOTE — TELEPHONE ENCOUNTER
Pt was informed per your instructions to get pearl on phone for Good [facility] or come into the office for discount card.  She understood and states she will come to the office

## 2019-05-28 DIAGNOSIS — F41.9 ANXIETY: ICD-10-CM

## 2019-05-28 RX ORDER — BUSPIRONE HYDROCHLORIDE 15 MG/1
15 TABLET ORAL 2 TIMES DAILY
Qty: 60 TAB | Refills: 0 | Status: SHIPPED | OUTPATIENT
Start: 2019-05-28 | End: 2019-07-02 | Stop reason: SDUPTHER

## 2019-05-29 DIAGNOSIS — F41.9 ANXIETY: ICD-10-CM

## 2019-07-02 ENCOUNTER — OFFICE VISIT (OUTPATIENT)
Dept: INTERNAL MEDICINE CLINIC | Age: 49
End: 2019-07-02

## 2019-07-02 VITALS
DIASTOLIC BLOOD PRESSURE: 92 MMHG | HEART RATE: 86 BPM | TEMPERATURE: 97.3 F | WEIGHT: 160 LBS | OXYGEN SATURATION: 96 % | BODY MASS INDEX: 25.71 KG/M2 | RESPIRATION RATE: 17 BRPM | HEIGHT: 66 IN | SYSTOLIC BLOOD PRESSURE: 137 MMHG

## 2019-07-02 DIAGNOSIS — F41.9 ANXIETY: ICD-10-CM

## 2019-07-02 DIAGNOSIS — R03.0 ELEVATED BP WITHOUT DIAGNOSIS OF HYPERTENSION: Primary | ICD-10-CM

## 2019-07-02 DIAGNOSIS — F10.10 MILD ALCOHOL USE DISORDER: ICD-10-CM

## 2019-07-02 DIAGNOSIS — F17.210 CIGARETTE NICOTINE DEPENDENCE WITHOUT COMPLICATION: ICD-10-CM

## 2019-07-02 RX ORDER — BUSPIRONE HYDROCHLORIDE 15 MG/1
15 TABLET ORAL 2 TIMES DAILY
Qty: 180 TAB | Refills: 3 | Status: SHIPPED | OUTPATIENT
Start: 2019-07-02 | End: 2020-07-27

## 2019-07-02 RX ORDER — CLONIDINE HYDROCHLORIDE 0.1 MG/1
0.1 TABLET ORAL
Qty: 30 TAB | Refills: 0 | Status: SHIPPED | OUTPATIENT
Start: 2019-07-02 | End: 2019-08-01 | Stop reason: SDUPTHER

## 2019-07-02 RX ORDER — IBUPROFEN 200 MG
1 TABLET ORAL EVERY 24 HOURS
Qty: 30 PATCH | Refills: 5 | Status: SHIPPED | OUTPATIENT
Start: 2019-07-02 | End: 2020-07-27

## 2019-07-02 NOTE — PATIENT INSTRUCTIONS
Curb BOTH your Mikel's hard lemonade and smoking by next OV. Clonidine (By mouth)   Clonidine ([id number]-[id number])  Treats high blood pressure. Also treats ADHD. Brand Name(s): Catapres, Coreysangeetha   There may be other brand names for this medicine. When This Medicine Should Not Be Used: This medicine is not right for everyone. Do not use it if you had an allergic reaction to clonidine. How to Use This Medicine:   Long Acting Suspension, Tablet, Long Acting Tablet  · Take your medicine as directed. Your dose may need to be changed several times to find what works best for you. · Swallow the extended-release tablet whole. Do not crush, break, or chew it. · Clonidine extended-release tablets work differently than clonidine immediate-release tablets. Do not switch from the extended-release tablets to the immediate-release tablets unless your doctor tells you to. · Measure the oral liquid medicine with a marked measuring spoon, oral syringe, or medicine cup. Shake the bottle well before each use. · Read and follow the patient instructions that come with this medicine. Talk to your doctor or pharmacist if you have any questions. · Missed dose: Take a dose as soon as you remember. If it is almost time for your next dose, wait until then and take a regular dose. Do not take extra medicine to make up for a missed dose. If you miss more than 1 dose of clonidine tablets, check with your doctor right away. · Store the medicine in a closed container at room temperature, away from heat, moisture, and direct light. Drugs and Foods to Avoid:   Ask your doctor or pharmacist before using any other medicine, including over-the-counter medicines, vitamins, and herbal products. · Do not use this medicine together with other products that contain clonidine. · Some medicines can affect how clonidine works. Tell your doctor if you are taking depression medicine. · Tell your doctor if you use anything else that makes you sleepy. Some examples are allergy medicine, narcotic pain medicine, and alcohol. Warnings While Using This Medicine:   · Tell your doctor if you are pregnant, breastfeeding, or have kidney disease, heart or blood vessel disease, heart rhythm problems, stomach or bowel problems, or a history of heart attack or stroke. · This medicine may make you drowsy, dizzy, or lightheaded. Do not drive or do anything that could be dangerous until you know how this medicine affects you. · This medicine may cause dryness of the eyes. Talk to your doctor about how to treat the dryness. · Do not stop using this medicine suddenly. Your doctor will need to slowly decrease your dose before you stop it completely. · Tell any doctor or dentist who treats you that you are using this medicine. You may need to stop using this medicine several days before you have surgery or medical tests. · Even if you feel well, do not stop using the medicine without asking your doctor first. This medicine will not cure your high blood pressure, but it will help keep it in the normal range. You may have to take blood pressure medicine for the rest of your life. · Your doctor will check your progress and the effects of this medicine at regular visits. Keep all appointments. · Keep all medicine out of the reach of children. Never share your medicine with anyone.   Possible Side Effects While Using This Medicine:   Call your doctor right away if you notice any of these side effects:  · Allergic reaction: Itching or hives, swelling in your face or hands, swelling or tingling in your mouth or throat, chest tightness, trouble breathing  · Fast, slow, pounding, or uneven heartbeat  · Lightheadedness, dizziness, fainting  · Swelling in your hands, ankles, or feet  · Unusual tiredness or weakness  If you notice these less serious side effects, talk with your doctor:   · Constipation, stomach pain  · Dry eyes, mouth, or throat  · Mild skin rash  · New or worsening headache  If you notice other side effects that you think are caused by this medicine, tell your doctor. Call your doctor for medical advice about side effects. You may report side effects to FDA at 6-484-HOB-2017  © 2017 Formerly named [facility] Information is for End User's use only and may not be sold, redistributed or otherwise used for commercial purposes. The above information is an  only. It is not intended as medical advice for individual conditions or treatments. Talk to your doctor, nurse or pharmacist before following any medical regimen to see if it is safe and effective for you.

## 2019-07-02 NOTE — PROGRESS NOTES
Pt Is here for   Chief Complaint   Patient presents with    Follow-up     HTN    PPD Placement     Pt denies pain at this time    1. Have you been to the ER, urgent care clinic since your last visit? Hospitalized since your last visit? No    2. Have you seen or consulted any other health care providers outside of the [address] [place] [address] since your last visit? Include any pap smears or colon screening.  No

## 2019-07-02 NOTE — PROGRESS NOTES
Rajat Servin is a 51 y.o. female and presents with Follow-up (HTN) and PPD Placement    Subjective:  Elevated BP Review:  The patient has high blood pressure  Diet and Lifestyle: generally does NOT try to follow a  low sodium diet, exercises sporadically   Home BP Monitoring: is not measured at home. Pertinent ROS: NOT prescribed medications at this time. No TIA's, chest pain on exertion, dyspnea on exertion, or swelling of ankles. Drinks 2 Mikes hard lemonade nightly to help with sleep. Continues to smoke, but smoking less. No longer 1 PPD, but now at 1/3 PPD. BP Readings from Last 3 Encounters:   07/02/19 (!) 137/92   07/12/18 118/80   07/10/18 (!) 134/100     Anxiety/Depression review:  Patient complains of psychomotor agitation. Still helpful for feeling overwhelmed, continues to handle a lot daily. Treatment includes buspar and no other therapies. She denies recurrent thoughts of death and suicidal thoughts without plan. She experiences the following side effects from the treatment: none.      Review of Systems  Constitutional: negative for fevers, chills, anorexia and weight loss  Eyes:   negative for visual disturbance, drainage, and irritation  ENT:   negative for tinnitus,sore throat,nasal congestion,ear pain,and hoarseness  Respiratory:  negative for cough, hemoptysis, dyspnea, and wheezing  CV:   negative for chest pain, palpitations, and lower extremity edema  GI:   negative for nausea, vomiting, diarrhea, abdominal pain, and melena  Endo:               negative for polyuria,polydipsia,polyphagia, and heat intolerance  Genitourinary: negative for frequency, urgency, dysuria, retention, and hematuria  Integument:  negative for rash, ulcerations, and pruritus  Hematologic:  negative for easy bruising and bleeding  Musculoskel: negative for arthralgias, muscle weakness,and joint pain/swelling  Neurological:  negative for headaches, dizziness, vertigo,and memory/gait problems  Behavl/Psych: negative for feelings of anxiety, depression, suicide, and mood changes    History reviewed. No pertinent past medical history. Past Surgical History:   Procedure Laterality Date    HX PARTIAL HYSTERECTOMY       Social History     Socioeconomic History    Marital status: SINGLE     Spouse name: Not on file    Number of children: Not on file    Years of education: Not on file    Highest education level: Not on file   Tobacco Use    Smoking status: Current Every Day Smoker    Smokeless tobacco: Never Used   Substance and Sexual Activity    Alcohol use: Yes     Alcohol/week: 0.0 oz     Comment: social    Drug use: No    Sexual activity: Yes     Partners: Male     Birth control/protection: None     Family History   Problem Relation Age of Onset    Cancer Mother         cancer of the blood    Diabetes Maternal Aunt      Current Outpatient Medications   Medication Sig Dispense Refill    busPIRone (BUSPAR) 15 mg tablet Take 1 Tab by mouth two (2) times a day. 180 Tab 3    nicotine (NICODERM CQ) 14 mg/24 hr patch 1 Patch by TransDERmal route every twenty-four (24) hours. 30 Patch 5    cloNIDine HCl (CATAPRES) 0.1 mg tablet Take 1 Tab by mouth nightly. 30 Tab 0    Cholecalciferol, Vitamin D3, (VITAMIN D3) 2,000 unit cap capsule Take 2,000 Units by mouth two (2) times a day. Indications: VITAMIN D DEFICIENCY (HIGH DOSE THERAPY) 60 Cap 11     No Known Allergies    Objective:  Visit Vitals  BP (!) 137/92 (BP 1 Location: Left arm, BP Patient Position: Sitting)   Pulse 86   Temp 97.3 °F (36.3 °C) (Oral)   Resp 17   Ht 5' 6\" (1.676 m)   Wt 160 lb (72.6 kg)   SpO2 96%   BMI 25.82 kg/m²     Wt Readings from Last 3 Encounters:   07/02/19 160 lb (72.6 kg)   07/12/18 158 lb (71.7 kg)   07/10/18 158 lb 12.8 oz (72 kg)     Physical Exam:   General appearance - alert, well appearing, and in no distress. Mental status - A/O x 4, normal mood and affect. Neck -Supple ,normal CSP. FROM, non-tender.  No significant adenopathy/thyromegaly. No JVD. Chest - CTA. Symmetric chest rise. No wheezing, rales or rhonchi. Heart - Normal rate, regular rhythm. Normal S1, S2. No MGR or clicks. Abdomen - Soft,non-distended. Normoactive BS in all quadrants. NT, no mass or HSM. Ext- Radial, DP pulses, 2+ bilaterally. No pedal edema, clubbing, or cyanosis. Skin-Warm and dry. No hyperpigmentation, ulcerations, or suspicious lesions. Neuro - Normal speech, no focal findings or movement disorder. Normal strength, gait, and muscle tone. Results for orders placed or performed in visit on 07/10/18   AMB POC TUBERCULOSIS, INTRADERMAL (SKIN TEST)   Result Value Ref Range    PPD negative Negative    mm Induration 0 mm       Assessment/Plan:  Started clonidine. Continued buspar. Smoking and alcohol cessation discussed for 3-10 minutes, pt planning to quit by tapering use down to 1/4 PPD, meds sent today. Medication Side Effects and Warnings were discussed with patient: yes   Patient Labs were reviewed: yes  Patient Past Records were reviewed: yes    See below for other orders   Follow-up and Dispositions    · Return in about 1 month (around 2019) for BP, smok cess, anxiety. ICD-10-CM ICD-9-CM    1. Elevated BP without diagnosis of hypertension R03.0 796.2 cloNIDine HCl (CATAPRES) 0.1 mg tablet   2. Anxiety F41.9 300.00 busPIRone (BUSPAR) 15 mg tablet   3. Cigarette nicotine dependence without complication W72.997 092.6 nicotine (NICODERM CQ) 14 mg/24 hr patch      cloNIDine HCl (CATAPRES) 0.1 mg tablet   4. Mild alcohol use disorder F10.10 305.00      Orders Placed This Encounter    busPIRone (BUSPAR) 15 mg tablet     Sig: Take 1 Tab by mouth two (2) times a day. Dispense:  180 Tab     Refill:  3    nicotine (NICODERM CQ) 14 mg/24 hr patch     Si Patch by TransDERmal route every twenty-four (24) hours.      Dispense:  30 Patch     Refill:  5    cloNIDine HCl (CATAPRES) 0.1 mg tablet     Sig: Take 1 Tab by mouth nightly. Dispense:  30 Tab     Refill:  0       Tanecha KAYDEN Mcduffie expressed understanding of plan. An After Visit Summary was offered/printed and given to the patient.

## 2019-07-05 ENCOUNTER — CLINICAL SUPPORT (OUTPATIENT)
Dept: INTERNAL MEDICINE CLINIC | Age: 49
End: 2019-07-05

## 2019-07-05 VITALS
RESPIRATION RATE: 17 BRPM | HEART RATE: 77 BPM | OXYGEN SATURATION: 97 % | BODY MASS INDEX: 25.71 KG/M2 | SYSTOLIC BLOOD PRESSURE: 133 MMHG | HEIGHT: 66 IN | WEIGHT: 160 LBS | DIASTOLIC BLOOD PRESSURE: 74 MMHG | TEMPERATURE: 98 F

## 2019-07-05 DIAGNOSIS — Z11.1 SCREENING EXAMINATION FOR PULMONARY TUBERCULOSIS: ICD-10-CM

## 2019-07-05 DIAGNOSIS — Z23 ENCOUNTER FOR IMMUNIZATION: ICD-10-CM

## 2019-07-05 LAB
MM INDURATION POC: 0 MM (ref 0–5)
PPD POC: NEGATIVE NEGATIVE

## 2019-07-05 NOTE — PROGRESS NOTES
BP Readings from Last 3 Encounters:   07/05/19 133/74   07/02/19 (!) 137/92   07/12/18 118/80     Pt is here for   Chief Complaint   Patient presents with    PPD Reading         PPD Reading Note  PPD read and results entered in [employer]. Result: 0 mm induration.   Interpretation: Negative  If test not read within 48-72 hours of initial placement, patient advised to repeat in other arm 1-3 weeks after this test.  Allergic reaction:No

## 2019-08-01 ENCOUNTER — OFFICE VISIT (OUTPATIENT)
Dept: INTERNAL MEDICINE CLINIC | Age: 49
End: 2019-08-01

## 2019-08-01 VITALS
HEART RATE: 65 BPM | OXYGEN SATURATION: 100 % | WEIGHT: 161 LBS | SYSTOLIC BLOOD PRESSURE: 131 MMHG | HEIGHT: 66 IN | BODY MASS INDEX: 25.88 KG/M2 | RESPIRATION RATE: 18 BRPM | DIASTOLIC BLOOD PRESSURE: 84 MMHG | TEMPERATURE: 97.2 F

## 2019-08-01 DIAGNOSIS — F41.9 ANXIETY: ICD-10-CM

## 2019-08-01 DIAGNOSIS — I10 ESSENTIAL HYPERTENSION: Primary | ICD-10-CM

## 2019-08-01 DIAGNOSIS — F17.210 CIGARETTE NICOTINE DEPENDENCE WITHOUT COMPLICATION: ICD-10-CM

## 2019-08-01 DIAGNOSIS — R03.0 ELEVATED BP WITHOUT DIAGNOSIS OF HYPERTENSION: ICD-10-CM

## 2019-08-01 RX ORDER — CLONIDINE HYDROCHLORIDE 0.1 MG/1
0.1 TABLET ORAL
Qty: 90 TAB | Refills: 3 | Status: SHIPPED | OUTPATIENT
Start: 2019-08-01 | End: 2020-08-11 | Stop reason: SDUPTHER

## 2019-08-01 NOTE — PATIENT INSTRUCTIONS
Low Sodium Diet (2,000 Milligram): Care Instructions  Your Care Instructions    Too much sodium causes your body to hold on to extra water. This can raise your blood pressure and force your heart and kidneys to work harder. In very serious cases, this could cause you to be put in the hospital. It might even be life-threatening. By limiting sodium, you will feel better and lower your risk of serious problems. The most common source of sodium is salt. People get most of the salt in their diet from canned, prepared, and packaged foods. Fast food and restaurant meals also are very high in sodium. Your doctor will probably limit your sodium to less than 2,000 milligrams (mg) a day. This limit counts all the sodium in prepared and packaged foods and any salt you add to your food. Follow-up care is a key part of your treatment and safety. Be sure to make and go to all appointments, and call your doctor if you are having problems. It's also a good idea to know your test results and keep a list of the medicines you take. How can you care for yourself at home? Read food labels  · Read labels on cans and food packages. The labels tell you how much sodium is in each serving. Make sure that you look at the serving size. If you eat more than the serving size, you have eaten more sodium. · Food labels also tell you the Percent Daily Value for sodium. Choose products with low Percent Daily Values for sodium. · Be aware that sodium can come in forms other than salt, including monosodium glutamate (MSG), sodium citrate, and sodium bicarbonate (baking soda). MSG is often added to Asian food. When you eat out, you can sometimes ask for food without MSG or added salt. Buy low-sodium foods  · Buy foods that are labeled \"unsalted\" (no salt added), \"sodium-free\" (less than 5 mg of sodium per serving), or \"low-sodium\" (less than 140 mg of sodium per serving).  Foods labeled \"reduced-sodium\" and \"light sodium\" may still have too much sodium. Be sure to read the label to see how much sodium you are getting. · Buy fresh vegetables, or frozen vegetables without added sauces. Buy low-sodium versions of canned vegetables, soups, and other canned goods. Prepare low-sodium meals  · Cut back on the amount of salt you use in cooking. This will help you adjust to the taste. Do not add salt after cooking. One teaspoon of salt has about 2,300 mg of sodium. · Take the salt shaker off the table. · Flavor your food with garlic, lemon juice, onion, vinegar, herbs, and spices. Do not use soy sauce, lite soy sauce, steak sauce, onion salt, garlic salt, celery salt, mustard, or ketchup on your food. · Use low-sodium salad dressings, sauces, and ketchup. Or make your own salad dressings and sauces without adding salt. · Use less salt (or none) when recipes call for it. You can often use half the salt a recipe calls for without losing flavor. Other foods such as rice, pasta, and grains do not need added salt. · Rinse canned vegetables, and cook them in fresh water. This removes some--but not all--of the salt. · Avoid water that is naturally high in sodium or that has been treated with water softeners, which add sodium. Call your local water company to find out the sodium content of your water supply. If you buy bottled water, read the label and choose a sodium-free brand. Avoid high-sodium foods  · Avoid eating:  ? Smoked, cured, salted, and canned meat, fish, and poultry. ? Ham, bennett, hot dogs, and luncheon meats. ? Regular, hard, and processed cheese and regular peanut butter. ? Crackers with salted tops, and other salted snack foods such as pretzels, chips, and salted popcorn. ? Frozen prepared meals, unless labeled low-sodium. ? Canned and dried soups, broths, and bouillon, unless labeled sodium-free or low-sodium. ? Canned vegetables, unless labeled sodium-free or low-sodium. ? [place] fries, pizza, tacos, and other fast foods.   ? Fred Steele olives, ketchup, and other condiments, especially soy sauce, unless labeled sodium-free or low-sodium. Where can you learn more? Go to http://laurie-cherri.info/. Enter U573 in the search box to learn more about \"Low Sodium Diet (2,000 Milligram): Care Instructions. \"  Current as of: November 7, 2018  Content Version: 12.1  © 2268-9126 [employer]. Care instructions adapted under license by [employer] (which disclaims liability or warranty for this information). If you have questions about a medical condition or this instruction, always ask your healthcare professional. [address] any warranty or liability for your use of this information. How to Read a Food Label to Limit Sodium: Care Instructions  Your Care Instructions  Sodium causes your body to hold on to extra water. This can raise your blood pressure and force your heart and kidneys to work harder. In very serious cases, this could cause you to be put in the hospital. It might even be life-threatening. By limiting sodium, you will feel better and lower your risk of serious problems. Processed foods, fast food, and restaurant foods are the major sources of dietary sodium. The most common name for sodium is salt. Try to limit how much sodium you eat to less than 2,300 milligrams (mg) a day. If you limit your sodium to 1,500 mg a day, you can lower your blood pressure even more. This limit counts all the salt that you eat in foods you cook or in packaged foods. Keep a list of everything you eat and drink. Follow-up care is a key part of your treatment and safety. Be sure to make and go to all appointments, and call your doctor if you are having problems. It's also a good idea to know your test results and keep a list of the medicines you take. How can you care for yourself at home?   Read ingredient lists on food labels  · Read the list of ingredients on food labels to help you find how much sodium is in a food. The label lists the ingredients in a food in descending order (from the most to the least). If salt or sodium is high on the list, there may be a lot of sodium in the food. · Know that sodium has different names. Sodium is also called monosodium glutamate (MSG, common in [place] food), sodium citrate, sodium alginate, sodium hydroxide, and sodium phosphate. Read Nutrition Facts labels  · On most foods, there is a Nutrition Facts label. This will tell you how much sodium is in one serving of food. Look at both the serving size and the sodium amount. The serving size is located at the top of the label, usually right under the \"Nutrition Facts\" title. The amount of sodium is given in the list under the title. It is given in milligrams (mg). ? Check the serving size carefully. A single serving is often very small, and you may eat more than one serving. If this is the case, you will eat more sodium than listed on the label. For example, if the serving size for a canned soup is 1 cup and the sodium amount is 470 mg, if you have 2 cups you will eat 940 mg of sodium. · The nutrition facts for fresh fruits and vegetables are not listed on the food. They may be listed somewhere in the store. These foods usually have no sodium or low sodium. · The Nutrition Facts label also gives you the Percent Daily Value for sodium. This is how much of the recommended amount of sodium a serving contains. The daily value for sodium is less than 2,300 mg. So if the Percent Daily Value says 50%, this means one serving is giving you half of this, or 1,150 mg. Buy low-sodium foods  · Look for foods that are made with less sodium. Watch for the following words on the label. ? \"Unsalted\" means there is no sodium added to the food. But there may be sodium already in the food naturally. ? \"Sodium-free\" means a serving has less than 5 mg of sodium. ?  \"Very low sodium\" means a serving has 35 mg or less of sodium. ? \"Low-sodium\" means a serving has 140 mg or less of sodium. · \"Reduced-sodium\" means that there is 25% less sodium than what the food normally has. This is still usually too much sodium. Try not to buy foods with this on the label. · Buy fresh vegetables, or frozen vegetables without added sauces. Buy low-sodium versions of canned vegetables, soups, and other canned goods. Where can you learn more? Go to http://laurie-cherri.info/. Enter 26 [id number] in the search box to learn more about \"How to Read a Food Label to Limit Sodium: Care Instructions. \"  Current as of: November 7, 2018  Content Version: 12.1  © 2166-4602 [employer]. Care instructions adapted under license by [employer] (which disclaims liability or warranty for this information). If you have questions about a medical condition or this instruction, always ask your healthcare professional. [place] [address] any warranty or liability for your use of this information.

## 2019-08-01 NOTE — PROGRESS NOTES
Roland Long is a 51 y.o. female and presents with Hypertension (follow up)    Subjective:  Pt here to f/u anxiety and HTN med start. Taking Clonidine. Feels better, sleeping better; feels like herself again. No acute complaints otherwise. Denies side effects from medication. BP Readings from Last 3 Encounters:   08/01/19 131/84   07/05/19 133/74   07/02/19 (!) 137/92     Review of Systems  Constitutional: negative for fevers, chills, anorexia and weight loss  Eyes:   negative for visual disturbance, drainage, and irritation  ENT:   negative for tinnitus,sore throat,nasal congestion,ear pain,and hoarseness  Respiratory:  negative for cough, hemoptysis, dyspnea, and wheezing  CV:   negative for chest pain, palpitations, and lower extremity edema  GI:   negative for nausea, vomiting, diarrhea, abdominal pain, and melena  Endo:               negative for polyuria,polydipsia,polyphagia, and heat intolerance  Genitourinary: negative for frequency, urgency, dysuria, retention, and hematuria  Integument:  negative for rash, ulcerations, and pruritus  Hematologic:  negative for easy bruising and bleeding  Musculoskel: negative for arthralgias, muscle weakness,and joint pain/swelling  Neurological:  negative for headaches, dizziness, vertigo,and memory/gait problems  Behavl/Psych: negative for feelings of anxiety, depression, suicide, and mood changes    History reviewed. No pertinent past medical history. Past Surgical History:   Procedure Laterality Date    HX PARTIAL HYSTERECTOMY       Social History     Socioeconomic History    Marital status: SINGLE     Spouse name: Not on file    Number of children: Not on file    Years of education: Not on file    Highest education level: Not on file   Tobacco Use    Smoking status: Current Every Day Smoker    Smokeless tobacco: Never Used   Substance and Sexual Activity    Alcohol use:  Yes     Alcohol/week: 0.0 standard drinks     Comment: social    Drug use: No    Sexual activity: Yes     Partners: Male     Birth control/protection: None     Family History   Problem Relation Age of Onset    Cancer Mother         cancer of the blood    Diabetes Maternal Aunt      Current Outpatient Medications   Medication Sig Dispense Refill    busPIRone (BUSPAR) 15 mg tablet Take 1 Tab by mouth two (2) times a day. 180 Tab 3    nicotine (NICODERM CQ) 14 mg/24 hr patch 1 Patch by TransDERmal route every twenty-four (24) hours. 30 Patch 5    cloNIDine HCl (CATAPRES) 0.1 mg tablet Take 1 Tab by mouth nightly. 30 Tab 0    Cholecalciferol, Vitamin D3, (VITAMIN D3) 2,000 unit cap capsule Take 2,000 Units by mouth two (2) times a day. Indications: VITAMIN D DEFICIENCY (HIGH DOSE THERAPY) 60 Cap 11     No Known Allergies    Objective:  Visit Vitals  /84 (BP 1 Location: Right arm, BP Patient Position: Sitting)   Pulse 65   Temp 97.2 °F (36.2 °C) (Oral)   Resp 18   Ht 5' 6\" (1.676 m)   Wt 161 lb (73 kg)   SpO2 100%   BMI 25.99 kg/m²     Wt Readings from Last 3 Encounters:   08/01/19 161 lb (73 kg)   07/05/19 160 lb (72.6 kg)   07/02/19 160 lb (72.6 kg)     Physical Exam:   General appearance - alert, well appearing, and in no distress. Mental status - A/O x 4, normal mood and affect. Neck -Supple ,normal CSP. FROM, non-tender. No significant adenopathy/thyromegaly. No JVD. Chest - CTA. Symmetric chest rise. No wheezing, rales or rhonchi. Heart - Normal rate, regular rhythm. Normal S1, S2. No MGR or clicks. Abdomen - Soft,non-distended. Normoactive BS in all quadrants. NT, no mass or HSM. Ext- Radial, DP pulses, 2+ bilaterally. No pedal edema, clubbing, or cyanosis. Skin-Warm and dry. No hyperpigmentation, ulcerations, or suspicious lesions. Neuro - Normal speech, no focal findings or movement disorder. Normal strength, gait, and muscle tone.      Results for orders placed or performed in visit on 07/05/19   AMB POC TUBERCULOSIS, INTRADERMAL (SKIN TEST)   Result Value Ref Range PPD Negative Negative    mm Induration 0 0 - 5 mm       Assessment/Plan:  Started clonidine. Continued buspar. Smoking and alcohol cessation discussed for 3-10 minutes, pt planning to quit by tapering use down to 1/4 PPD, meds sent today. Medication Side Effects and Warnings were discussed with patient: yes   Patient Labs were reviewed: yes  Patient Past Records were reviewed: yes    See below for other orders       No diagnosis found. No orders of the defined types were placed in this encounter. Keerthi Reed expressed understanding of plan. An After Visit Summary was offered/printed and given to the patient.

## 2019-08-01 NOTE — PROGRESS NOTES
Pt Is here for   Chief Complaint   Patient presents with    Hypertension     follow up     1. Have you been to the ER, urgent care clinic since your last visit? Hospitalized since your last visit? No    2. Have you seen or consulted any other health care providers outside of the [address] [place] [address] since your last visit? Include any pap smears or colon screening.  No    Pt denies pain at this time

## 2019-11-15 ENCOUNTER — OFFICE VISIT (OUTPATIENT)
Dept: INTERNAL MEDICINE CLINIC | Age: 49
End: 2019-11-15

## 2019-11-15 VITALS
HEIGHT: 66 IN | SYSTOLIC BLOOD PRESSURE: 131 MMHG | OXYGEN SATURATION: 100 % | HEART RATE: 78 BPM | DIASTOLIC BLOOD PRESSURE: 84 MMHG | TEMPERATURE: 96.7 F | RESPIRATION RATE: 18 BRPM | WEIGHT: 166 LBS | BODY MASS INDEX: 26.68 KG/M2

## 2019-11-15 DIAGNOSIS — F41.9 ANXIETY: ICD-10-CM

## 2019-11-15 DIAGNOSIS — F17.210 CIGARETTE NICOTINE DEPENDENCE WITHOUT COMPLICATION: ICD-10-CM

## 2019-11-15 DIAGNOSIS — I10 ESSENTIAL HYPERTENSION: Primary | ICD-10-CM

## 2019-11-15 NOTE — PROGRESS NOTES
Pt Is here for   Chief Complaint   Patient presents with    Hypertension     HTN     Pt denies pain at this time    1. Have you been to the ER, urgent care clinic since your last visit? Hospitalized since your last visit? No    2. Have you seen or consulted any other health care providers outside of the [address] [place] [address] since your last visit? Include any pap smears or colon screening.  No

## 2019-11-15 NOTE — PATIENT INSTRUCTIONS

## 2020-08-11 ENCOUNTER — OFFICE VISIT (OUTPATIENT)
Dept: INTERNAL MEDICINE CLINIC | Age: 50
End: 2020-08-11
Payer: MEDICAID

## 2020-08-11 VITALS
SYSTOLIC BLOOD PRESSURE: 134 MMHG | TEMPERATURE: 97.6 F | OXYGEN SATURATION: 97 % | HEIGHT: 66 IN | WEIGHT: 167 LBS | BODY MASS INDEX: 26.84 KG/M2 | RESPIRATION RATE: 17 BRPM | DIASTOLIC BLOOD PRESSURE: 96 MMHG | HEART RATE: 92 BPM

## 2020-08-11 DIAGNOSIS — Z13.228 SCREENING FOR ENDOCRINE, NUTRITIONAL, METABOLIC AND IMMUNITY DISORDER: ICD-10-CM

## 2020-08-11 DIAGNOSIS — F17.210 CIGARETTE NICOTINE DEPENDENCE WITHOUT COMPLICATION: ICD-10-CM

## 2020-08-11 DIAGNOSIS — Z13.0 SCREENING FOR ENDOCRINE, NUTRITIONAL, METABOLIC AND IMMUNITY DISORDER: ICD-10-CM

## 2020-08-11 DIAGNOSIS — Z13.21 SCREENING FOR ENDOCRINE, NUTRITIONAL, METABOLIC AND IMMUNITY DISORDER: ICD-10-CM

## 2020-08-11 DIAGNOSIS — Z13.220 SCREENING FOR LIPID DISORDERS: ICD-10-CM

## 2020-08-11 DIAGNOSIS — I10 ESSENTIAL HYPERTENSION: ICD-10-CM

## 2020-08-11 DIAGNOSIS — Z00.00 ADULT GENERAL MEDICAL EXAMINATION: Primary | ICD-10-CM

## 2020-08-11 DIAGNOSIS — Z13.29 SCREENING FOR ENDOCRINE, NUTRITIONAL, METABOLIC AND IMMUNITY DISORDER: ICD-10-CM

## 2020-08-11 DIAGNOSIS — Z11.1 SCREENING EXAMINATION FOR PULMONARY TUBERCULOSIS: ICD-10-CM

## 2020-08-11 DIAGNOSIS — Z23 ENCOUNTER FOR IMMUNIZATION: ICD-10-CM

## 2020-08-11 PROCEDURE — 99396 PREV VISIT EST AGE 40-64: CPT | Performed by: NURSE PRACTITIONER

## 2020-08-11 PROCEDURE — 86580 TB INTRADERMAL TEST: CPT | Performed by: NURSE PRACTITIONER

## 2020-08-11 RX ORDER — CLONIDINE HYDROCHLORIDE 0.1 MG/1
0.1 TABLET ORAL
Qty: 90 TAB | Refills: 3 | Status: SHIPPED | OUTPATIENT
Start: 2020-08-11 | End: 2020-11-17 | Stop reason: SDUPTHER

## 2020-08-11 NOTE — PROGRESS NOTES
Joseph Gomez is a 53 y.o. female and presents with Complete Physical    Subjective:  Hypertension Review:  The patient has hypertension and would like annual labs  Diet and Lifestyle: generally does try to follow a  low sodium diet, very active at work. Home BP Monitoring: is not measured at home. Pertinent ROS: taking medications as instructed, no medication side effects noted. No TIA's, chest pain on exertion, dyspnea on exertion, or swelling of ankles. BP Readings from Last 3 Encounters:   08/11/20 (!) 134/96   11/15/19 131/84   08/01/19 131/84     Smoking cessation Review:  Patient presents to discuss smoking cessation. The patient currently is smoking 1/2  Ppd, down from 1 ppd. She has smoked for multiple  years. She has tried to quit multiple times in the past using patches. Would like to quit by taking patches. Also thinks she needs a new TB testing for work also. Denies SOB, wt loss, hemoptysis, night sweats, travel outside the [place], and cough. Works in . Review of Systems  Constitutional: negative for fevers, chills, anorexia and weight loss  Respiratory:  negative for cough, hemoptysis, dyspnea, and wheezing  CV:   negative for chest pain, palpitations, and lower extremity edema  GI:   negative for nausea, vomiting, diarrhea, abdominal pain, and melena  Endo:               negative for polyuria,polydipsia,polyphagia, and heat intolerance  Genitourinary: negative for frequency, urgency, dysuria, retention, and hematuria  Integument:  negative for rash, ulcerations, and pruritus  Hematologic:  negative for easy bruising and bleeding  Musculoskel: negative for arthralgias, muscle weakness,and joint pain/swelling  Neurological:  negative for headaches, dizziness, vertigo,and memory/gait problems  Behavl/Psych: negative for feelings of depression, suicide, and mood changes    History reviewed. No pertinent past medical history.   Past Surgical History:   Procedure Laterality Date    HX PARTIAL HYSTERECTOMY       Social History     Socioeconomic History    Marital status: SINGLE     Spouse name: Not on file    Number of children: Not on file    Years of education: Not on file    Highest education level: Not on file   Tobacco Use    Smoking status: Current Every Day Smoker    Smokeless tobacco: Never Used   Substance and Sexual Activity    Alcohol use: Yes     Alcohol/week: 0.0 standard drinks     Comment: social    Drug use: No    Sexual activity: Yes     Partners: Male     Birth control/protection: None     Family History   Problem Relation Age of Onset    Cancer Mother         cancer of the blood    Diabetes Maternal Aunt      Current Outpatient Medications   Medication Sig Dispense Refill    cloNIDine HCL (CATAPRES) 0.1 mg tablet Take 1 Tab by mouth nightly. 90 Tab 3    busPIRone (BUSPAR) 15 mg tablet Take 1 tablet by mouth twice daily 180 Tab 3    nicotine (NICODERM CQ) 14 mg/24 hr patch APPLY 1 PATCH TOPICALLY EVERY 24 HOURS 90 Patch 0    Cholecalciferol, Vitamin D3, (VITAMIN D3) 2,000 unit cap capsule Take 2,000 Units by mouth two (2) times a day. Indications: VITAMIN D DEFICIENCY (HIGH DOSE THERAPY) 60 Cap 11     No Known Allergies    Objective:  Visit Vitals  BP (!) 134/96 (BP 1 Location: Right arm, BP Patient Position: Sitting)   Pulse 92   Temp 97.6 °F (36.4 °C) (Oral)   Resp 17   Ht 5' 6\" (1.676 m)   Wt 167 lb (75.8 kg)   SpO2 97%   BMI 26.95 kg/m²     Wt Readings from Last 3 Encounters:   08/11/20 167 lb (75.8 kg)   11/15/19 166 lb (75.3 kg)   08/01/19 161 lb (73 kg)     Physical Exam:   General appearance - alert, well appearing, and in no distress. Mental status - A/O x 4, normal mood and affect. Neck -Supple ,normal CSP. FROM, non-tender. No significant adenopathy/thyromegaly. No JVD. Chest - CTA. Symmetric chest rise. No wheezing, rales or rhonchi. Heart - Normal rate, regular rhythm. Normal S1, S2. No MGR or clicks. Abdomen - Soft,non-distended.  Normoactive BS in all quadrants. NT, no mass or HSM. Ext- Radial, DP pulses, 2+ bilaterally. No pedal edema, clubbing, or cyanosis. Skin-Warm and dry. No hyperpigmentation, ulcerations, or suspicious lesions. Neuro - Normal speech, no focal findings or movement disorder. Normal strength, gait, and muscle tone. Assessment/Plan:  Will monitor BP, ran up stairs before visit today, will have pt monitor at home. May increase clonidine to 0.2 mg or trial amlodipine instead. Medication Side Effects and Warnings were discussed with patient: yes   Patient Labs were reviewed: yes  Patient Past Records were reviewed: yes    See below for other orders   Follow-up and Dispositions    · Return in about 3 months (around 11/11/2020) for VV- HTN, smok cess f/u. ICD-10-CM ICD-9-CM    1. Adult general medical examination  Z00.00 V70.9    2. Cigarette nicotine dependence without complication  O05.563 733.0 cloNIDine HCL (CATAPRES) 0.1 mg tablet   3. Essential hypertension  I10 401.9 cloNIDine HCL (CATAPRES) 0.1 mg tablet      METABOLIC PANEL, COMPREHENSIVE      CBC WITH AUTOMATED DIFF      HEMOGLOBIN A1C WITH EAG      LIPID PANEL      TSH 3RD GENERATION   4. Screening for lipid disorders  Z13.220 V77.91 LIPID PANEL   5. Screening for endocrine, nutritional, metabolic and immunity disorder  L64.94 S15.19 METABOLIC PANEL, COMPREHENSIVE    Z13.21  CBC WITH AUTOMATED DIFF    Z13.228  HEMOGLOBIN A1C WITH EAG    Z13.0  TSH 3RD GENERATION   6. Encounter for immunization  Z23 V03.89 AMB POC TUBERCULOSIS, INTRADERMAL (SKIN TEST)   7. Screening examination for pulmonary tuberculosis  Z11.1 V74.1 AMB POC TUBERCULOSIS, INTRADERMAL (SKIN TEST)     Orders Placed This Encounter    METABOLIC PANEL, COMPREHENSIVE    CBC WITH AUTOMATED DIFF    HEMOGLOBIN A1C WITH EAG    LIPID PANEL    TSH 3RD GENERATION    AMB POC TUBERCULOSIS, INTRADERMAL (SKIN TEST)    cloNIDine HCL (CATAPRES) 0.1 mg tablet     Sig: Take 1 Tab by mouth nightly. Dispense:  90 Tab     Refill:  3       Tanlittlea KAYDEN Mcduffie expressed understanding of plan. An After Visit Summary was offered/printed and given to the patient.

## 2020-08-11 NOTE — PROGRESS NOTES
Pt is here for   Chief Complaint   Patient presents with    Complete Physical     Pt denies pain at this time    1. Have you been to the ER, urgent care clinic since your last visit? Hospitalized since your last visit? No    2. Have you seen or consulted any other health care providers outside of the [address] [place] [address] since your last visit? Include any pap smears or colon screening. No      PPD Placement note  Ayanna Judge, 53 y.o. female is here today for placement of PPD test  Reason for PPD test: employment  Pt taken PPD test before: yes  Verified in allergy area and with patient that they are not allergic to the products PPD is made of (Phenol or Tween). yes  Is patient taking any oral or IV steroid medication now or have they taken it in the last month? no  Has the patient ever received the BCG vaccine?: no  Has the patient been in recent contact with anyone known or suspected of having active TB disease?: No       Date of exposure (if applicable): n/a       Name of person they were exposed to (if applicable): n/a  Patient's Country of origin?: [place]  O: Alert and oriented in NAD. P:  PPD placed on 8/11/2020 given in the right forearm tolerated well. Patient advised to return for reading within 48-72 hours.

## 2020-08-11 NOTE — PATIENT INSTRUCTIONS
Learning About Benefits From Quitting Smoking How does quitting smoking make you healthier? If you're thinking about quitting smoking, you may have a few reasons to be smoke-free. Your health may be one of them. · When you quit smoking, you lower your risks for cancer, lung disease, heart attack, stroke, blood vessel disease, and blindness from macular degeneration. · When you're smoke-free, you get sick less often, and you heal faster. You are less likely to get colds, flu, bronchitis, and pneumonia. · As a nonsmoker, you may find that your mood is better and you are less stressed. When and how will you feel healthier? Quitting has real health benefits that start from day 1 of being smoke-free. And the longer you stay smoke-free, the healthier you get and the better you feel. The first hours · After just 20 minutes, your blood pressure and heart rate go down. That means there's less stress on your heart and blood vessels. · Within 12 hours, the level of carbon monoxide in your blood drops back to normal. That makes room for more oxygen. With more oxygen in your body, you may notice that you have more energy than when you smoked. After 2 weeks · Your lungs start to work better. · Your risk of heart attack starts to drop. After 1 month · When your lungs are clear, you cough less and breathe deeper, so it's easier to be active. · Your sense of taste and smell return. That means you can enjoy food more than you have since you started smoking. Over the years · Over the years, your risks of heart disease, heart attack, and stroke are lower. · After 10 years, your risk of dying from lung cancer is cut by about half. And your risk for many other types of cancer is lower too. How would quitting help others in your life? When you quit smoking, you improve the health of everyone who now breathes in your smoke. · Their heart, lung, and cancer risks drop, much like yours. · They are sick less. For babies and small children, living smoke-free means they're less likely to have ear infections, pneumonia, and bronchitis. · If you're a woman who is or will be pregnant someday, quitting smoking means a healthier . · Children who are close to you are less likely to become adult smokers. Where can you learn more? Go to http://www.wilson.com/ Enter 052 806 72 11 in the search box to learn more about \"Learning About Benefits From Quitting Smoking. \" Current as of: 2020               Content Version: 12.5 © 3048-3742 Healthwise, Incorporated. Care instructions adapted under license by [employer] (which disclaims liability or warranty for this information). If you have questions about a medical condition or this instruction, always ask your healthcare professional. [address] any warranty or liability for your use of this information.

## 2020-08-12 LAB
ALBUMIN SERPL-MCNC: 4.5 G/DL (ref 3.8–4.8)
ALBUMIN/GLOB SERPL: 1.5 {RATIO} (ref 1.2–2.2)
ALP SERPL-CCNC: 73 IU/L (ref 39–117)
ALT SERPL-CCNC: 17 IU/L (ref 0–32)
AST SERPL-CCNC: 25 IU/L (ref 0–40)
BASOPHILS # BLD AUTO: 0 X10E3/UL (ref 0–0.2)
BASOPHILS NFR BLD AUTO: 0 %
BILIRUB SERPL-MCNC: 0.3 MG/DL (ref 0–1.2)
BUN SERPL-MCNC: 12 MG/DL (ref 6–24)
BUN/CREAT SERPL: 15 (ref 9–23)
CALCIUM SERPL-MCNC: 10 MG/DL (ref 8.7–10.2)
CHLORIDE SERPL-SCNC: 104 MMOL/L (ref 96–106)
CHOLEST SERPL-MCNC: 199 MG/DL (ref 100–199)
CO2 SERPL-SCNC: 24 MMOL/L (ref 20–29)
CREAT SERPL-MCNC: 0.82 MG/DL (ref 0.57–1)
EOSINOPHIL # BLD AUTO: 0.1 X10E3/UL (ref 0–0.4)
EOSINOPHIL NFR BLD AUTO: 1 %
ERYTHROCYTE [DISTWIDTH] IN BLOOD BY AUTOMATED COUNT: 12.4 % (ref 11.7–15.4)
EST. AVERAGE GLUCOSE BLD GHB EST-MCNC: 120 MG/DL
GLOBULIN SER CALC-MCNC: 3 G/DL (ref 1.5–4.5)
GLUCOSE SERPL-MCNC: 93 MG/DL (ref 65–99)
HBA1C MFR BLD: 5.8 % (ref 4.8–5.6)
HCT VFR BLD AUTO: 38.6 % (ref 34–46.6)
HDLC SERPL-MCNC: 57 MG/DL
HGB BLD-MCNC: 12.5 G/DL (ref 11.1–15.9)
IMM GRANULOCYTES # BLD AUTO: 0 X10E3/UL (ref 0–0.1)
IMM GRANULOCYTES NFR BLD AUTO: 0 %
INTERPRETATION, 910389: NORMAL
LDLC SERPL CALC-MCNC: 92 MG/DL (ref 0–99)
LYMPHOCYTES # BLD AUTO: 2.3 X10E3/UL (ref 0.7–3.1)
LYMPHOCYTES NFR BLD AUTO: 34 %
MCH RBC QN AUTO: 29.7 PG (ref 26.6–33)
MCHC RBC AUTO-ENTMCNC: 32.4 G/DL (ref 31.5–35.7)
MCV RBC AUTO: 92 FL (ref 79–97)
MONOCYTES # BLD AUTO: 0.4 X10E3/UL (ref 0.1–0.9)
MONOCYTES NFR BLD AUTO: 6 %
NEUTROPHILS # BLD AUTO: 4 X10E3/UL (ref 1.4–7)
NEUTROPHILS NFR BLD AUTO: 59 %
PLATELET # BLD AUTO: 276 X10E3/UL (ref 150–450)
POTASSIUM SERPL-SCNC: 4 MMOL/L (ref 3.5–5.2)
PROT SERPL-MCNC: 7.5 G/DL (ref 6–8.5)
RBC # BLD AUTO: 4.21 X10E6/UL (ref 3.77–5.28)
SODIUM SERPL-SCNC: 141 MMOL/L (ref 134–144)
TRIGL SERPL-MCNC: 249 MG/DL (ref 0–149)
TSH SERPL DL<=0.005 MIU/L-ACNC: 2.24 UIU/ML (ref 0.45–4.5)
VLDLC SERPL CALC-MCNC: 50 MG/DL (ref 5–40)
WBC # BLD AUTO: 6.8 X10E3/UL (ref 3.4–10.8)

## 2020-08-13 LAB
MM INDURATION POC: 0 MM (ref 0–5)
PPD POC: NEGATIVE NEGATIVE

## 2020-08-13 NOTE — PROGRESS NOTES
PPD Reading Note  PPD read and results entered in [address]. Result: 0 mm induration.   Interpretation: negative  If test not read within 48-72 hours of initial placement, patient advised to repeat in other arm 1-3 weeks after this test.  Allergic reaction: no reaction noted

## 2020-11-17 ENCOUNTER — VIRTUAL VISIT (OUTPATIENT)
Dept: INTERNAL MEDICINE CLINIC | Age: 50
End: 2020-11-17
Payer: MEDICAID

## 2020-11-17 DIAGNOSIS — I10 ESSENTIAL HYPERTENSION: Primary | ICD-10-CM

## 2020-11-17 DIAGNOSIS — F17.210 CIGARETTE NICOTINE DEPENDENCE WITHOUT COMPLICATION: ICD-10-CM

## 2020-11-17 PROCEDURE — 99213 OFFICE O/P EST LOW 20 MIN: CPT | Performed by: NURSE PRACTITIONER

## 2020-11-17 RX ORDER — CLONIDINE HYDROCHLORIDE 0.2 MG/1
0.2 TABLET ORAL
Qty: 90 TAB | Refills: 3 | Status: SHIPPED | OUTPATIENT
Start: 2020-11-17 | End: 2021-08-11

## 2020-11-17 NOTE — PROGRESS NOTES
Pt is here for   Chief Complaint   Patient presents with    Follow-up     3 months. . DOXY. -[phone]     Pt denies pain at this time    1. Have you been to the ER, urgent care clinic since your last visit? Hospitalized since your last visit? No    2. Have you seen or consulted any other health care providers outside of the [address] [place] [address] since your last visit? Include any pap smears or colon screening.  No

## 2020-11-17 NOTE — PROGRESS NOTES
Alejo Mauricio is a 53 y.o. female who was seen by synchronous (real-time) audio-video technology on 11/17/2020. Consent: Alejo Mauricio, who was seen by synchronous (real-time) audio-video technology, and/or her healthcare decision maker, is aware that this patient-initiated, Telehealth encounter on 11/17/2020 is a billable service, with coverage as determined by her insurance carrier. She is aware that she may receive a bill and has provided verbal consent to proceed: Yes. Assessment & Plan:   Diagnoses and all orders for this visit:    1. Essential hypertension  -     cloNIDine HCL (CATAPRES) 0.2 mg tablet; Take 1 Tab by mouth nightly. 2. Cigarette nicotine dependence without complication  -     cloNIDine HCL (CATAPRES) 0.2 mg tablet; Take 1 Tab by mouth nightly. Follow-up and Dispositions    · Return in about 4 weeks (around 12/15/2020) for OV- HTN med change, SMOK CESS (needs afternoon appt, 2 or later please). Discussed with pt taking clonidine at dinner time since has earlier work schedule to help reduce sedation upon waking. Increased dose to 0.2 mg to lower BP to goal and help with smoking cessation also. Goal of 1/4 ppd set also and possible quit date of 51th birthday on 5/7/[birth year]. Subjective: Alejo Mauricio is a 53 y.o. female who was seen for Follow-up (3 months. . NORRIS. -[phone])      Pt presents to f/u HTN and smoking cessation. Home BP averaging 140-150's. Smoking ~ 1/3 PPD now, down from 1/2 PPD. Taking clonidine. Feels good overall. No acute complaints otherwise. Denies side effects from medication. Denies HA, SOB, CP, and leg swelling. Prior to Admission medications    Medication Sig Start Date End Date Taking? Authorizing Provider   cloNIDine HCL (CATAPRES) 0.2 mg tablet Take 1 Tab by mouth nightly.  11/17/20  Yes Dai Moreira NP   busPIRone (BUSPAR) 15 mg tablet Take 1 tablet by mouth twice daily 7/27/20  Yes Magali Strickland NP   Cholecalciferol, Vitamin D3, (VITAMIN D3) 2,000 unit cap capsule Take 2,000 Units by mouth two (2) times a day. Indications: VITAMIN D DEFICIENCY (HIGH DOSE THERAPY) 4/9/18  Yes Jung Moreira NP   cloNIDine HCL (CATAPRES) 0.1 mg tablet Take 1 Tab by mouth nightly. 8/11/20 11/17/20  James Price NP   nicotine (NICODERM CQ) 14 mg/24 hr patch APPLY 1 PATCH TOPICALLY EVERY 24 HOURS 7/27/20   James Price NP     No Known Allergies    Patient Active Problem List   Diagnosis Code    Anxiety F41.9    Eczema L30.9    Vitamin D deficiency E55.9     Patient Active Problem List    Diagnosis Date Noted    Vitamin D deficiency 01/16/2017    Eczema 01/12/2017    Anxiety 11/16/2015     Current Outpatient Medications   Medication Sig Dispense Refill    cloNIDine HCL (CATAPRES) 0.2 mg tablet Take 1 Tab by mouth nightly. 90 Tab 3    busPIRone (BUSPAR) 15 mg tablet Take 1 tablet by mouth twice daily 180 Tab 3    Cholecalciferol, Vitamin D3, (VITAMIN D3) 2,000 unit cap capsule Take 2,000 Units by mouth two (2) times a day. Indications: VITAMIN D DEFICIENCY (HIGH DOSE THERAPY) 60 Cap 11    nicotine (NICODERM CQ) 14 mg/24 hr patch APPLY 1 PATCH TOPICALLY EVERY 24 HOURS 90 Patch 0     No Known Allergies  History reviewed. No pertinent past medical history. Past Surgical History:   Procedure Laterality Date    HX PARTIAL HYSTERECTOMY       Family History   Problem Relation Age of Onset    Cancer Mother         cancer of the blood    Diabetes Maternal Aunt      Social History     Tobacco Use    Smoking status: Current Every Day Smoker    Smokeless tobacco: Never Used   Substance Use Topics    Alcohol use: Yes     Alcohol/week: 0.0 standard drinks     Comment: social       Review of Systems   Constitutional: Negative for fever and malaise/fatigue. Eyes: Negative for blurred vision. Respiratory: Negative for cough and shortness of breath. Cardiovascular: Negative for chest pain and leg swelling.    Neurological: Negative for dizziness, weakness and headaches. Objective:   Vital Signs: (As obtained by patient/caregiver at home)  There were no vitals taken for this visit. Physical Exam:  General appearance - alert, well appearing, and in no distress. Mental status - A/O x 4,normal mood and affect. Eyes- mild periorbital edema, drainage, or irritation noted. Nose- no obvious drainage or swelling. Throat- no obvious swelling, goiter, or notable lymphadenopathy  Chest - Symmetric chest rise. No wheezing or coughing. No distress. Skin- normal skin tone noted. No hyperpigmentation or obvious deformities. No diaphoresis noted. No flushing. Neuro - Normal speech, no focal findings or movement disorder. Other pertinent observable physical exam findings:-        We discussed the expected course, resolution and complications of the diagnosis(es) in detail. Medication risks, benefits, costs, interactions, and alternatives were discussed as indicated. I advised her to contact the office if her condition worsens, changes or fails to improve as anticipated. She expressed understanding with the diagnosis(es) and plan. Marie Owen is a 53 y.o. female who was evaluated by a video visit encounter for concerns as above. Patient identification was verified prior to start of the visit. A caregiver was present when appropriate. Due to this being a TeleHealth encounter (During [facility]-18 public health emergency), evaluation of the following organ systems was limited: Vitals/Constitutional/EENT/Resp/CV/GI//MS/Neuro/Skin/Heme-Lymph-Imm. Pursuant to the emergency declaration under the [facility], [facility] waiver authority and the [employer] Act, this Virtual  Visit was conducted, with patient's (and/or legal guardian's) consent, to reduce the patient's risk of exposure to COVID-19 and provide necessary medical care.      Services were provided through a video synchronous discussion virtually to substitute for in-person clinic visit. Patient and provider were located at their individual homes.       Alaina Zaidi NP

## 2021-01-25 ENCOUNTER — OFFICE VISIT (OUTPATIENT)
Dept: URGENT CARE | Age: 51
End: 2021-01-25
Payer: MEDICAID

## 2021-01-25 VITALS — HEART RATE: 82 BPM | RESPIRATION RATE: 16 BRPM | OXYGEN SATURATION: 98 % | TEMPERATURE: 98.3 F

## 2021-01-25 DIAGNOSIS — Z20.822 ENCOUNTER FOR LABORATORY TESTING FOR COVID-19 VIRUS: Primary | ICD-10-CM

## 2021-01-25 PROCEDURE — 99202 OFFICE O/P NEW SF 15 MIN: CPT | Performed by: NURSE PRACTITIONER

## 2021-01-25 NOTE — LETTER
January 25, 2021 Adele Love [address] [facility] [id number] Dear Speedy Man: Thank you for requesting access to [employer]. Please follow the instructions below to securely access and download your online medical record. [employer] allows you to send messages to your doctor, view your test results, renew your prescriptions, schedule appointments, and more. How Do I Sign Up? 1. In your internet browser, go to [email or web address]. 2. Click on the First Time User? Click Here link in the Sign In box. You will see the New Member Sign Up page. 3. Enter your [employer] Access Code exactly as it appears below. You will not need to use this code after youve completed the sign-up process. If you do not sign up before the expiration date, you must request a new code. [employer] Access Code: Activation code not generated Current [facility] Status: Active 4. Enter the last four digits of your Social Security Number (xxxx) and Date of Birth (mm/dd/yyyy) as indicated and click Submit. You will be taken to the next sign-up page. 5. Create a Kiwiple ID. This will be your [employer] login ID and cannot be changed, so think of one that is secure and easy to remember. 6. Create a Ki[employer]iple password. You can change your password at any time. 7. Enter your Password Reset Question and Answer. This can be used at a later time if you forget your password. 8. Enter your e-mail address. You will receive e-mail notification when new information is available in [employer]. 9. Click Sign Up. You can now view and download portions of your medical record. 10. Click the Download Summary menu link to download a portable copy of your medical information. Additional Information If you have questions, please visit the Frequently Asked Questions section of the [employer] website at [email or web address] Remember, [facility] is NOT to be used for urgent needs. For medical emergencies, dial 911. Now available from your iPhone and Android! Sincerely, [employer]

## 2021-01-25 NOTE — PROGRESS NOTES
Subjective: (As above and below)       This patient was seen in [facility] at [address] [place] [address] Urgent Care while in their vehicle due to COVID-19 pandemic with PPE and focused examination in order to decrease community viral transmission. The patient/guardian gave verbal consent to treat. Chief Complaint   Patient presents with   Neymar Brown is a 53 y.o. female who presents for COVID-19 testing  Works at a . No known unmasked exposures but has concern that she may have been around it. Currently has not tried any therapies and denies any symptoms at this point in time. Feels well otherwise. Denies any symptoms currently or recently. Review of Systems - negative except as listed above    Reviewed PmHx, RxHx, FmHx, SocHx, AllgHx and updated in chart. Family History   Problem Relation Age of Onset    Cancer Mother         cancer of the blood    Diabetes Maternal Aunt      History reviewed. No pertinent past medical history. Social History     Socioeconomic History    Marital status: SINGLE     Spouse name: Not on file    Number of children: Not on file    Years of education: Not on file    Highest education level: Not on file   Tobacco Use    Smoking status: Current Every Day Smoker    Smokeless tobacco: Never Used   Substance and Sexual Activity    Alcohol use: Yes     Alcohol/week: 0.0 standard drinks     Comment: social    Drug use: No    Sexual activity: Yes     Partners: Male     Birth control/protection: None          Current Outpatient Medications   Medication Sig    cloNIDine HCL (CATAPRES) 0.2 mg tablet Take 1 Tab by mouth nightly.  busPIRone (BUSPAR) 15 mg tablet Take 1 tablet by mouth twice daily    nicotine (NICODERM CQ) 14 mg/24 hr patch APPLY 1 PATCH TOPICALLY EVERY 24 HOURS    Cholecalciferol, Vitamin D3, (VITAMIN D3) 2,000 unit cap capsule Take 2,000 Units by mouth two (2) times a day.  Indications: VITAMIN D DEFICIENCY (HIGH DOSE THERAPY)     No current facility-administered medications for this visit. Objective:     Vitals:    01/25/21 1411   Pulse: 82   Resp: 16   Temp: 98.3 °F (36.8 °C)   SpO2: 98%       Physical Exam  General appearance  appears well hydrated and does not appear toxic, no acute distress  Eyes - EOMs intact. Non injected. No scleral icterus   Ears - no external swelling  Neck/Lymphatics  trachea midline, no obvious swelling  Chest - normal breathing effort. No active cough heard. No audible wheezing. Heart - HR-see vitals  Skin - no observable rashes or pallor  Neurologic- alert and oriented x 3  Psychiatric- normal mood, behavior and though content. Assessment/ Plan:     1. Encounter for laboratory testing for COVID-19 virus    - NOVEL CORONAVIRUS (COVID-19)      Will test for COVID  Should follow CDC guidelines for self isolation for any positive results.       Giacomo Webster NP

## 2021-01-27 LAB — SARS-COV-2, NAA: NOT DETECTED

## 2021-02-16 ENCOUNTER — OFFICE VISIT (OUTPATIENT)
Dept: INTERNAL MEDICINE CLINIC | Age: 51
End: 2021-02-16
Payer: MEDICAID

## 2021-02-16 VITALS
BODY MASS INDEX: 25.71 KG/M2 | RESPIRATION RATE: 18 BRPM | WEIGHT: 160 LBS | TEMPERATURE: 98 F | OXYGEN SATURATION: 99 % | DIASTOLIC BLOOD PRESSURE: 79 MMHG | HEIGHT: 66 IN | HEART RATE: 74 BPM | SYSTOLIC BLOOD PRESSURE: 139 MMHG

## 2021-02-16 DIAGNOSIS — F43.21 GRIEF: ICD-10-CM

## 2021-02-16 DIAGNOSIS — I10 ESSENTIAL HYPERTENSION: Primary | ICD-10-CM

## 2021-02-16 DIAGNOSIS — F17.210 CIGARETTE NICOTINE DEPENDENCE WITHOUT COMPLICATION: ICD-10-CM

## 2021-02-16 PROCEDURE — 99213 OFFICE O/P EST LOW 20 MIN: CPT | Performed by: NURSE PRACTITIONER

## 2021-02-16 NOTE — PATIENT INSTRUCTIONS
Use OTC PEPCID (twice daily) or Prilosec (once daily) for the next 2 weeks and avoid acidic, spicy, greasy, tomato-based, and dairy foods. Avoid NSAIDs- Ibuprofen, Motrin, Advil, and Aleve. Try Tylenol or heat for pain. 
 
 
!!!!!STOP SMOKING!!!!!  
Call 4 [phone] QUIT-NOW/([phone]) for counseling and support. Visit http://smokefree. gov for online support, live chat, and text messaging. Learning About Benefits From Quitting Smoking How does quitting smoking make you healthier? If you're thinking about quitting smoking, you may have a few reasons to be smoke-free. Your health may be one of them. · When you quit smoking, you lower your risks for cancer, lung disease, heart attack, stroke, blood vessel disease, and blindness from macular degeneration. · When you're smoke-free, you get sick less often, and you heal faster. You are less likely to get colds, flu, bronchitis, and pneumonia. · As a nonsmoker, you may find that your mood is better and you are less stressed. When and how will you feel healthier? Quitting has real health benefits that start from day 1 of being smoke-free. And the longer you stay smoke-free, the healthier you get and the better you feel. The first hours · After just 20 minutes, your blood pressure and heart rate go down. That means there's less stress on your heart and blood vessels. · Within 12 hours, the level of carbon monoxide in your blood drops back to normal. That makes room for more oxygen. With more oxygen in your body, you may notice that you have more energy than when you smoked. After 2 weeks · Your lungs start to work better. · Your risk of heart attack starts to drop. After 1 month · When your lungs are clear, you cough less and breathe deeper, so it's easier to be active. · Your sense of taste and smell return. That means you can enjoy food more than you have since you started smoking. Over the years · Over the years, your risks of heart disease, heart attack, and stroke are lower. · After 10 years, your risk of dying from lung cancer is cut by about half. And your risk for many other types of cancer is lower too. How would quitting help others in your life? When you quit smoking, you improve the health of everyone who now breathes in your smoke. · Their heart, lung, and cancer risks drop, much like yours. · They are sick less. For babies and small children, living smoke-free means they're less likely to have ear infections, pneumonia, and bronchitis. · If you're a woman who is or will be pregnant someday, quitting smoking means a healthier . · Children who are close to you are less likely to become adult smokers. Where can you learn more? Go to http://www.wilson.com/ Enter 052 806 72 11 in the search box to learn more about \"Learning About Benefits From Quitting Smoking. \" Current as of: 2020               Content Version: 12.6 © 2899-5966 [employer]. Care instructions adapted under license by [employer] (which disclaims liability or warranty for this information). If you have questions about a medical condition or this instruction, always ask your healthcare professional. [place] [address] any warranty or liability for your use of this information.

## 2021-02-16 NOTE — PROGRESS NOTES
[address] [place] [address] (: 1970) is a 53 y.o. female, established patient, here for evaluation of the following chief complaint(s):  Hypertension (follow up)       ASSESSMENT/PLAN:  1. Essential hypertension    2. Cigarette nicotine dependence without complication    3. Grief        Return in about 6 months (around 2021) for OV- HTN, annual labs, repeat A1C, Vit D..      Pt asked to complete follow by next visit: stop smoking (advice and handout given)    SUBJECTIVE/OBJECTIVE:  HPI    Pt presents to f/u HTN and smoking cessation. Still smoking 1/3 ppd, but had recent death. Taking clonidine 0.2 mg. Denies side effects from medication. Feels good. No acute complaints otherwise. No report of unilateral weakness, headaches, blurring vision, CP, SOB,or  leg swelling.    BP Readings from Last 3 Encounters:   21 139/79   20 (!) 134/96   11/15/19 131/84           Review of Systems  Constitutional: negative for fevers, chills, anorexia and weight loss  Respiratory:  negative for cough, hemoptysis, dyspnea, and wheezing  CV:   negative for chest pain, palpitations, and lower extremity edema  GI:   negative for nausea, vomiting, diarrhea, abdominal pain, and melena  Endo:               negative for polyuria,polydipsia,polyphagia, and heat intolerance  Genitourinary: negative for frequency, urgency, dysuria, retention, and hematuria  Integument:  negative for rash, ulcerations, and pruritus  Hematologic:  negative for easy bruising and bleeding  Musculoskel: negative for arthralgias, muscle weakness,and joint pain/swelling  Neurological:  negative for headaches, dizziness, vertigo,and memory/gait problems  Behavl/Psych: negative for feelings of anxiety, depression, suicide, and mood changes    Visit Vitals  /79 (BP 1 Location: Right upper arm, BP Patient Position: Sitting, BP Cuff Size: Large adult)   Pulse 74   Temp 98 °F (36.7 °C) (Oral)   Resp 18   Ht 5' 6\" (1.676 m)   Wt 160 lb (72.6 kg)   SpO2 99% BMI 25.82 kg/m²       Wt Readings from Last 3 Encounters:   02/16/21 160 lb (72.6 kg)   08/11/20 167 lb (75.8 kg)   11/15/19 166 lb (75.3 kg)         Physical Exam:   General appearance - alert, well appearing, and in no distress. Mental status - A/O x 4,normal mood and affect. Chest - CTA. Symmetric chest rise. No wheezing. No distress. Heart - Normal rate & rhythm. Normal S1 & S2. No MGR. Abdomen- Soft, round. Non-distended, NT. No pulsatile masses or hernias. Ext-  No pedal edema, clubbing, or cyanosis. Skin-Warm and dry. No hyperpigmentation, ulcerations, or suspicious lesions. Neuro - Normal speech, no focal findings or movement disorder. Normal strength, gait, and muscle tone. On this date 02/16/21 I have spent 22 minutes reviewing previous notes, test results and face to face with the patient discussing the diagnosis and importance of compliance with the treatment plan as well as documenting on the day of the visit. An electronic signature was used to authenticate this note.   -- Ely Guerrero NP

## 2021-02-16 NOTE — PROGRESS NOTES
Pt is here for   Chief Complaint   Patient presents with    Hypertension     follow up     1. Have you been to the ER, urgent care clinic since your last visit? Hospitalized since your last visit? No    2. Have you seen or consulted any other health care providers outside of the [address] [place] [address] since your last visit? Include any pap smears or colon screening.  No    Denies pain at this time

## 2021-04-26 ENCOUNTER — TELEPHONE (OUTPATIENT)
Dept: INTERNAL MEDICINE CLINIC | Age: 51
End: 2021-04-26

## 2021-04-27 NOTE — TELEPHONE ENCOUNTER
Spoke with pt who states that she needs a letter stating that she has anxiety and that she's taking medications for this, pt states that she is currently homeless and living in a shelter. Pt states that she wants letter emailed to her so that she can give to her worker who is trying to get her in a home.

## 2021-05-26 ENCOUNTER — HOSPITAL ENCOUNTER (EMERGENCY)
Age: 51
Discharge: LEFT BEFORE DISCHARGE | End: 2021-05-26
Payer: MEDICAID

## 2021-05-26 VITALS
OXYGEN SATURATION: 100 % | DIASTOLIC BLOOD PRESSURE: 80 MMHG | SYSTOLIC BLOOD PRESSURE: 120 MMHG | RESPIRATION RATE: 18 BRPM | TEMPERATURE: 98.5 F | HEART RATE: 100 BPM

## 2021-05-26 PROCEDURE — 75810000275 HC EMERGENCY DEPT VISIT NO LEVEL OF CARE

## 2021-05-27 ENCOUNTER — HOSPITAL ENCOUNTER (EMERGENCY)
Age: 51
Discharge: HOME | End: 2021-05-27
Attending: EMERGENCY MEDICINE
Payer: MEDICAID

## 2021-05-27 ENCOUNTER — APPOINTMENT (OUTPATIENT)
Dept: CT IMAGING | Age: 51
End: 2021-05-27
Attending: PHYSICIAN ASSISTANT
Payer: MEDICAID

## 2021-05-27 VITALS
TEMPERATURE: 98 F | DIASTOLIC BLOOD PRESSURE: 92 MMHG | HEART RATE: 86 BPM | SYSTOLIC BLOOD PRESSURE: 124 MMHG | HEIGHT: 65 IN | OXYGEN SATURATION: 100 % | RESPIRATION RATE: 18 BRPM | BODY MASS INDEX: 24.99 KG/M2 | WEIGHT: 150 LBS

## 2021-05-27 DIAGNOSIS — Z72.0 TOBACCO ABUSE: ICD-10-CM

## 2021-05-27 DIAGNOSIS — K57.31 DIVERTICULOSIS LARGE INTESTINE W/O PERFORATION OR ABSCESS W/BLEEDING: Primary | ICD-10-CM

## 2021-05-27 LAB
ABO + RH BLD: NORMAL
ALBUMIN SERPL-MCNC: 3.8 G/DL (ref 3.5–5)
ALBUMIN/GLOB SERPL: 1.1 {RATIO} (ref 1.1–2.2)
ALP SERPL-CCNC: 57 U/L (ref 45–117)
ALT SERPL-CCNC: 21 U/L (ref 12–78)
ANION GAP SERPL CALC-SCNC: 10 MMOL/L (ref 5–15)
APPEARANCE UR: ABNORMAL
AST SERPL-CCNC: 21 U/L (ref 15–37)
BASOPHILS # BLD: 0 K/UL (ref 0–0.1)
BASOPHILS NFR BLD: 0 % (ref 0–1)
BILIRUB SERPL-MCNC: 0.4 MG/DL (ref 0.2–1)
BILIRUB UR QL: NEGATIVE
BLOOD GROUP ANTIBODIES SERPL: NORMAL
BUN SERPL-MCNC: 13 MG/DL (ref 6–20)
BUN/CREAT SERPL: 16 (ref 12–20)
CALCIUM SERPL-MCNC: 8.5 MG/DL (ref 8.5–10.1)
CHLORIDE SERPL-SCNC: 103 MMOL/L (ref 97–108)
CO2 SERPL-SCNC: 28 MMOL/L (ref 21–32)
COLOR UR: ABNORMAL
CREAT SERPL-MCNC: 0.8 MG/DL (ref 0.55–1.02)
DIFFERENTIAL METHOD BLD: ABNORMAL
EOSINOPHIL # BLD: 0 K/UL (ref 0–0.4)
EOSINOPHIL NFR BLD: 1 % (ref 0–7)
ERYTHROCYTE [DISTWIDTH] IN BLOOD BY AUTOMATED COUNT: 12.3 % (ref 11.5–14.5)
GLOBULIN SER CALC-MCNC: 3.5 G/DL (ref 2–4)
GLUCOSE SERPL-MCNC: 108 MG/DL (ref 65–100)
GLUCOSE UR STRIP.AUTO-MCNC: NEGATIVE MG/DL
HCT VFR BLD AUTO: 31.7 % (ref 35–47)
HEMOCCULT STL QL: POSITIVE
HGB BLD-MCNC: 10.4 G/DL (ref 11.5–16)
HGB UR QL STRIP: NEGATIVE
IMM GRANULOCYTES # BLD AUTO: 0 K/UL (ref 0–0.04)
IMM GRANULOCYTES NFR BLD AUTO: 1 % (ref 0–0.5)
INR PPP: 1 (ref 0.9–1.1)
KETONES UR QL STRIP.AUTO: NEGATIVE MG/DL
LEUKOCYTE ESTERASE UR QL STRIP.AUTO: NEGATIVE
LIPASE SERPL-CCNC: 78 U/L (ref 73–393)
LYMPHOCYTES # BLD: 1.5 K/UL (ref 0.8–3.5)
LYMPHOCYTES NFR BLD: 26 % (ref 12–49)
MCH RBC QN AUTO: 30.8 PG (ref 26–34)
MCHC RBC AUTO-ENTMCNC: 32.8 G/DL (ref 30–36.5)
MCV RBC AUTO: 93.8 FL (ref 80–99)
MONOCYTES # BLD: 0.3 K/UL (ref 0–1)
MONOCYTES NFR BLD: 6 % (ref 5–13)
NEUTS SEG # BLD: 3.9 K/UL (ref 1.8–8)
NEUTS SEG NFR BLD: 66 % (ref 32–75)
NITRITE UR QL STRIP.AUTO: NEGATIVE
NRBC # BLD: 0 K/UL (ref 0–0.01)
NRBC BLD-RTO: 0 PER 100 WBC
PH UR STRIP: 7 [PH] (ref 5–8)
PLATELET # BLD AUTO: 277 K/UL (ref 150–400)
PMV BLD AUTO: 10.2 FL (ref 8.9–12.9)
POTASSIUM SERPL-SCNC: 3.7 MMOL/L (ref 3.5–5.1)
PROT SERPL-MCNC: 7.3 G/DL (ref 6.4–8.2)
PROT UR STRIP-MCNC: NEGATIVE MG/DL
PROTHROMBIN TIME: 10 SEC (ref 9–11.1)
RBC # BLD AUTO: 3.38 M/UL (ref 3.8–5.2)
SODIUM SERPL-SCNC: 141 MMOL/L (ref 136–145)
SP GR UR REFRACTOMETRY: 1.01 (ref 1–1.03)
SPECIMEN EXP DATE BLD: NORMAL
UROBILINOGEN UR QL STRIP.AUTO: 1 EU/DL (ref 0.2–1)
WBC # BLD AUTO: 5.8 K/UL (ref 3.6–11)

## 2021-05-27 PROCEDURE — 36415 COLL VENOUS BLD VENIPUNCTURE: CPT

## 2021-05-27 PROCEDURE — 74177 CT ABD & PELVIS W/CONTRAST: CPT

## 2021-05-27 PROCEDURE — 85025 COMPLETE CBC W/AUTO DIFF WBC: CPT

## 2021-05-27 PROCEDURE — 83690 ASSAY OF LIPASE: CPT

## 2021-05-27 PROCEDURE — 80053 COMPREHEN METABOLIC PANEL: CPT

## 2021-05-27 PROCEDURE — 81003 URINALYSIS AUTO W/O SCOPE: CPT

## 2021-05-27 PROCEDURE — 85610 PROTHROMBIN TIME: CPT

## 2021-05-27 PROCEDURE — 74011000636 HC RX REV CODE- 636: Performed by: PHYSICIAN ASSISTANT

## 2021-05-27 PROCEDURE — 86901 BLOOD TYPING SEROLOGIC RH(D): CPT

## 2021-05-27 PROCEDURE — 82272 OCCULT BLD FECES 1-3 TESTS: CPT

## 2021-05-27 PROCEDURE — 74011000636 HC RX REV CODE- 636: Performed by: EMERGENCY MEDICINE

## 2021-05-27 PROCEDURE — 99284 EMERGENCY DEPT VISIT MOD MDM: CPT

## 2021-05-27 RX ORDER — ONDANSETRON 2 MG/ML
4 INJECTION INTRAMUSCULAR; INTRAVENOUS
Status: DISCONTINUED | OUTPATIENT
Start: 2021-05-27 | End: 2021-05-27 | Stop reason: HOSPADM

## 2021-05-27 RX ORDER — SODIUM CHLORIDE 0.9 % (FLUSH) 0.9 %
5-10 SYRINGE (ML) INJECTION
Status: COMPLETED | OUTPATIENT
Start: 2021-05-27 | End: 2021-05-27

## 2021-05-27 RX ADMIN — IOPAMIDOL 100 ML: 755 INJECTION, SOLUTION INTRAVENOUS at 11:44

## 2021-05-27 RX ADMIN — DIATRIZOATE MEGLUMINE AND DIATRIZOATE SODIUM 30 ML: 600; 100 SOLUTION ORAL; RECTAL at 10:21

## 2021-05-27 RX ADMIN — Medication 10 ML: at 11:44

## 2021-05-27 NOTE — ED PROVIDER NOTES
EMERGENCY DEPARTMENT HISTORY AND PHYSICAL EXAM      Date: 5/27/2021  Patient Name: Evelyn Delgado    History of Presenting Illness     Chief Complaint   Patient presents with    Rectal Bleeding     Pt c/o blood in stool x2days. Reports large amount, denies abd pain last night, reports mild pain to lower back. Went to [facility] but left, they didn't do anything for her     History Provided By: Patient    HPI: Evelyn Delgado, 49 y.o. female with medical history significant for tobacco abuse, partial hysterectomy, hypertension who presents via self to the ED with cc of acute moderate dark red blood in stool noticed 2 days prior to arrival.  Endorses that she noticed clots in the toilet bowl. States that her stool was soft and she did not have to strain. Denies any history of melena or hematochezia. Endorses some low back pain, but denies any specific abdominal pain. Endorses that she was feeling slightly fatigued today. States that she went to [facility] ED for her symptoms, but they took too long so she left without being seen. No anticoagulation. Specifically denies fever, chills, nausea, vomiting, chest pain, shortness of breath, palpitations, lightheadedness, dizziness, syncope, seizure, rectal pain, hemorrhoids, dysuria, frequency, urgency, hematuria, vaginal bleeding or discharge. Endorses taking ibuprofen for her low back pain with minimal relief. Patient has not had any colonoscopies. PCP: Jasmyn Arechiga NP    There are no other complaints, changes, or physical findings at this time. No current facility-administered medications on file prior to encounter. Current Outpatient Medications on File Prior to Encounter   Medication Sig Dispense Refill    cloNIDine HCL (CATAPRES) 0.2 mg tablet Take 1 Tab by mouth nightly.  90 Tab 3    busPIRone (BUSPAR) 15 mg tablet Take 1 tablet by mouth twice daily 180 Tab 3    nicotine (NICODERM CQ) 14 mg/24 hr patch APPLY 1 PATCH TOPICALLY EVERY 24 HOURS (Patient not taking: Reported on 5/27/2021) 90 Patch 0    Cholecalciferol, Vitamin D3, (VITAMIN D3) 2,000 unit cap capsule Take 2,000 Units by mouth two (2) times a day. Indications: VITAMIN D DEFICIENCY (HIGH DOSE THERAPY) (Patient not taking: Reported on 5/27/2021) 60 Cap 11     Past History     Past Medical History:  History reviewed. No pertinent past medical history. Past Surgical History:  Past Surgical History:   Procedure Laterality Date    HX PARTIAL HYSTERECTOMY       Family History:  Family History   Problem Relation Age of Onset    Cancer Mother         cancer of the blood    Diabetes Maternal Aunt      Social History:  Social History     Tobacco Use    Smoking status: Current Every Day Smoker     Packs/day: 0.50    Smokeless tobacco: Never Used   Vaping Use    Vaping Use: Never used   Substance Use Topics    Alcohol use: Yes     Alcohol/week: 0.0 standard drinks     Comment: social    Drug use: No     Allergies:  No Known Allergies  Review of Systems   Review of Systems   Constitutional: Positive for fatigue. Negative for activity change, appetite change, chills, diaphoresis, fever and unexpected weight change. HENT: Negative. Negative for congestion, postnasal drip, rhinorrhea, sore throat, trouble swallowing and voice change. Eyes: Negative for photophobia, pain and visual disturbance. Respiratory: Negative for cough, chest tightness, shortness of breath and wheezing. Cardiovascular: Negative for chest pain and palpitations. Gastrointestinal: Positive for blood in stool. Negative for abdominal distention, abdominal pain, anal bleeding, constipation, diarrhea, nausea, rectal pain and vomiting. Genitourinary: Negative for decreased urine volume, difficulty urinating, dysuria, flank pain, frequency, pelvic pain, urgency, vaginal bleeding and vaginal discharge. Musculoskeletal: Negative. Negative for arthralgias, back pain, myalgias and neck stiffness. Skin: Negative. Negative for rash. Neurological: Negative for seizures, syncope, weakness, light-headedness, numbness and headaches. Hematological: Does not bruise/bleed easily. Psychiatric/Behavioral: Negative. Negative for confusion. Physical Exam   Physical Exam  Vitals and nursing note reviewed. Exam conducted with a chaperone present. Constitutional:       General: She is not in acute distress. Appearance: Normal appearance. She is well-developed. She is not ill-appearing, toxic-appearing or diaphoretic. HENT:      Head: Normocephalic and atraumatic. Right Ear: Hearing and external ear normal.      Left Ear: Hearing and external ear normal.      Nose: Nose normal.      Mouth/Throat:      Mouth: Mucous membranes are moist.      Comments: No pallor. Eyes:      Conjunctiva/sclera: Conjunctivae normal.      Pupils: Pupils are equal, round, and reactive to light. Cardiovascular:      Rate and Rhythm: Normal rate and regular rhythm. Pulses: Normal pulses. Heart sounds: Normal heart sounds. Pulmonary:      Effort: Pulmonary effort is normal. No respiratory distress. Breath sounds: Normal breath sounds. Abdominal:      General: Abdomen is flat. Bowel sounds are normal.      Palpations: Abdomen is soft. Tenderness: There is no abdominal tenderness. There is no right CVA tenderness, left CVA tenderness, guarding or rebound. Negative signs include Martinez's sign and McBurney's sign. Hernia: No hernia is present. Genitourinary:     Rectum: Guaiac result positive. No mass, tenderness, anal fissure, external hemorrhoid or internal hemorrhoid. Normal anal tone. Comments: Scant black/ dark red blood noted. No active hemorrhage. Musculoskeletal:         General: Normal range of motion. Cervical back: Normal range of motion. Skin:     General: Skin is warm and dry. Coloration: Skin is not ashen, cyanotic or pale.    Neurological:      Mental Status: She is alert and oriented to person, place, and time. Psychiatric:         Behavior: Behavior normal.         Thought Content: Thought content normal.         Judgment: Judgment normal.       Diagnostic Study Results   Labs -     Recent Results (from the past 12 hour(s))   CBC WITH AUTOMATED DIFF    Collection Time: 05/27/21 10:00 AM   Result Value Ref Range    WBC 5.8 3.6 - 11.0 K/uL    RBC 3.38 (L) 3.80 - 5.20 M/uL    HGB 10.4 (L) 11.5 - 16.0 g/dL    HCT 31.7 (L) 35.0 - 47.0 %    MCV 93.8 80.0 - 99.0 FL    MCH 30.8 26.0 - 34.0 PG    MCHC 32.8 30.0 - 36.5 g/dL    RDW 12.3 11.5 - 14.5 %    PLATELET 759 605 - 242 K/uL    MPV 10.2 8.9 - 12.9 FL    NRBC 0.0 0  WBC    ABSOLUTE NRBC 0.00 0.00 - 0.01 K/uL    NEUTROPHILS 66 32 - 75 %    LYMPHOCYTES 26 12 - 49 %    MONOCYTES 6 5 - 13 %    EOSINOPHILS 1 0 - 7 %    BASOPHILS 0 0 - 1 %    IMMATURE GRANULOCYTES 1 (H) 0.0 - 0.5 %    ABS. NEUTROPHILS 3.9 1.8 - 8.0 K/UL    ABS. LYMPHOCYTES 1.5 0.8 - 3.5 K/UL    ABS. MONOCYTES 0.3 0.0 - 1.0 K/UL    ABS. EOSINOPHILS 0.0 0.0 - 0.4 K/UL    ABS. BASOPHILS 0.0 0.0 - 0.1 K/UL    ABS. IMM. GRANS. 0.0 0.00 - 0.04 K/UL    DF AUTOMATED     METABOLIC PANEL, COMPREHENSIVE    Collection Time: 05/27/21 10:00 AM   Result Value Ref Range    Sodium 141 136 - 145 mmol/L    Potassium 3.7 3.5 - 5.1 mmol/L    Chloride 103 97 - 108 mmol/L    CO2 28 21 - 32 mmol/L    Anion gap 10 5 - 15 mmol/L    Glucose 108 (H) 65 - 100 mg/dL    BUN 13 6 - 20 MG/DL    Creatinine 0.80 0.55 - 1.02 MG/DL    BUN/Creatinine ratio 16 12 - 20      GFR est AA >60 >60 ml/min/1.73m2    GFR est non-AA >60 >60 ml/min/1.73m2    Calcium 8.5 8.5 - 10.1 MG/DL    Bilirubin, total 0.4 0.2 - 1.0 MG/DL    ALT (SGPT) 21 12 - 78 U/L    AST (SGOT) 21 15 - 37 U/L    Alk.  phosphatase 57 45 - 117 U/L    Protein, total 7.3 6.4 - 8.2 g/dL    Albumin 3.8 3.5 - 5.0 g/dL    Globulin 3.5 2.0 - 4.0 g/dL    A-G Ratio 1.1 1.1 - 2.2     URINALYSIS W/ RFLX MICROSCOPIC    Collection Time: 05/27/21 10:00 AM   Result Value Ref Range    Color YELLOW/STRAW      Appearance CLOUDY (A) CLEAR      Specific gravity 1.015 1.003 - 1.030      pH (UA) 7.0 5.0 - 8.0      Protein Negative NEG mg/dL    Glucose Negative NEG mg/dL    Ketone Negative NEG mg/dL    Bilirubin Negative NEG      Blood Negative NEG      Urobilinogen 1.0 0.2 - 1.0 EU/dL    Nitrites Negative NEG      Leukocyte Esterase Negative NEG     PROTHROMBIN TIME + INR    Collection Time: 05/27/21 10:00 AM   Result Value Ref Range    INR 1.0 0.9 - 1.1      Prothrombin time 10.0 9.0 - 11.1 sec   LIPASE    Collection Time: 05/27/21 10:00 AM   Result Value Ref Range    Lipase 78 73 - 393 U/L   OCCULT BLOOD, STOOL    Collection Time: 05/27/21 10:41 AM   Result Value Ref Range    Occult blood, stool Positive (A) NEG         Radiologic Studies -   CT ABD PELV W CONT   Final Result   No acute findings. Colonic diverticulosis, cholecystolithiasis, and   additional incidentals as above. NOTE: This examination was not optimized for detection of active   gastrointestinal hemorrhage. If there is ongoing hemorrhage or concern dedicated   GI bleeding CT examination after delay to clear oral contrast could be   performed. CT ABD PELV W CONT    Result Date: 5/27/2021  No acute findings. Colonic diverticulosis, cholecystolithiasis, and additional incidentals as above. NOTE: This examination was not optimized for detection of active gastrointestinal hemorrhage. If there is ongoing hemorrhage or concern dedicated GI bleeding CT examination after delay to clear oral contrast could be performed. Medical Decision Making   I am the first provider for this patient. I reviewed the vital signs, available nursing notes, past medical history, past surgical history, family history and social history. Vital Signs-Reviewed the patient's vital signs.   Patient Vitals for the past 24 hrs:   Temp Pulse Resp BP SpO2   05/27/21 1208  86 18 (!) 124/92 100 %   05/27/21 1016  80  132/84  05/27/21 0927 98 °F (36.7 °C) 88 16 (!) 138/97 100 %     Pulse Oximetry Analysis - 100% on RA (normal)    Records Reviewed: Nursing Notes, Old Medical Records, Previous Radiology Studies and Previous Laboratory Studies    Provider Notes (Medical Decision Making):   Patient presents with lower GI bleeding. DDx: AVM, diverticulosis, diverticulitis, IBD, IBS, colitis, hemorrhoids. Will get labs, T+S, keep on monitor and give fluids to start if prior EF% allows. Will also get orthostatics PRN. ED Course:   Initial assessment performed. The patients presenting problems have been discussed, and they are in agreement with the care plan formulated and outlined with them. I have encouraged them to ask questions as they arise throughout their visit. ED Course as of May 27 1246   Thu May 27, 2021   1224 Patient is currently sitting upright in no apparent distress. She is hemodynamically stable with no active hemorrhage on exam.  I discussed patient's presenting symptoms, history, lab results, CT results with attending, Dr. Lydia Palencia, outpatient follow-up with GI strict ED precautions. Educated patient extensively on red flag symptoms to return to the ED as well as importance of urgent follow-up with GI. Patient agrees with this care plan. [SM]      ED Course User Index  [SM] Alee Clay PA-C      Procedure Note - Rectal Exam:   9:48 AM  Performed by: TRISTIAN Mi  Chaperoned by: Tamara Braga ED tech  Rectal exam performed. Scant black/ dark red stool was collected. Stool was collected and sent to the lab for Hemoccult testing. The procedure took 1-15 minutes, and pt tolerated well. I have scheduled pt follow up appointment with [facility]. Progress Note:   Updated pt on all returned results and findings. Discussed the importance of proper follow up as referred below along with return precautions.  Pt in agreement with the care plan and expresses agreement with and understanding of all items discussed. Disposition:  12:46 PM  I have discussed with patient their diagnosis, treatment, and follow up plan. The patient agrees to follow up as outlined in discharge paperwork and also to return to the ED with any worsening. Codi Barakat PA-C      PLAN:  1. Current Discharge Medication List        2. Follow-up Information     Follow up With Specialties Details [facility] [id number] [facility]  Call in 1 day To confirm your appointment. Your ppointment is scheduled for 6/8/21 at 11:15 AM. [address]  [phone]        Return to ED if worse     Diagnosis     Clinical Impression:   1. Diverticulosis large intestine w/o perforation or abscess w/bleeding    2. Tobacco abuse            Please note that this dictation was completed with Dragon, computer voice recognition software. Quite often unanticipated grammatical, syntax, homophones, and other interpretive errors are inadvertently transcribed by the computer software. Please disregard these errors. Additionally, please excuse any errors that have escaped final proofreading.

## 2021-05-27 NOTE — ED NOTES
Emergency Department Nursing Plan of Care       The Nursing Plan of Care is developed from the Nursing assessment and Emergency Department Attending provider initial evaluation. The plan of care may be reviewed in the ED Provider note.     The Plan of Care was developed with the following considerations:   Patient / Family readiness to learn indicated by:verbalized understanding  Persons(s) to be included in education: patient  Barriers to Learning/Limitations:No    Signed     Salina Culp RN    5/27/2021   12:57 PM

## 2021-05-27 NOTE — ED TRIAGE NOTES
TRIAGE NOTE:  Patient arrives with c/o blood in stool. Patient reports had 2 BMs yesterday that were bloody and reports today did have blood in stool but states not as much as yesterday. Patient reports feeling weak.

## 2021-08-11 DIAGNOSIS — I10 ESSENTIAL HYPERTENSION: ICD-10-CM

## 2021-08-11 DIAGNOSIS — F17.210 CIGARETTE NICOTINE DEPENDENCE WITHOUT COMPLICATION: ICD-10-CM

## 2021-08-11 DIAGNOSIS — F41.9 ANXIETY: ICD-10-CM

## 2021-08-11 RX ORDER — BUSPIRONE HYDROCHLORIDE 15 MG/1
TABLET ORAL
Qty: 180 TABLET | Refills: 0 | Status: SHIPPED | OUTPATIENT
Start: 2021-08-11 | End: 2021-11-18 | Stop reason: SDUPTHER

## 2021-08-11 RX ORDER — CLONIDINE HYDROCHLORIDE 0.2 MG/1
TABLET ORAL
Qty: 90 TABLET | Refills: 0 | Status: SHIPPED | OUTPATIENT
Start: 2021-08-11 | End: 2021-11-18 | Stop reason: SDUPTHER

## 2021-08-18 ENCOUNTER — VIRTUAL VISIT (OUTPATIENT)
Dept: INTERNAL MEDICINE CLINIC | Age: 51
End: 2021-08-18
Payer: MEDICAID

## 2021-08-18 DIAGNOSIS — E78.1 HYPERTRIGLYCERIDEMIA WITHOUT HYPERCHOLESTEROLEMIA: ICD-10-CM

## 2021-08-18 DIAGNOSIS — I10 ESSENTIAL HYPERTENSION: ICD-10-CM

## 2021-08-18 DIAGNOSIS — Z87.19 HISTORY OF RECTAL BLEEDING: ICD-10-CM

## 2021-08-18 DIAGNOSIS — Z12.11 SPECIAL SCREENING FOR MALIGNANT NEOPLASMS, COLON: Primary | ICD-10-CM

## 2021-08-18 DIAGNOSIS — F41.9 ANXIETY: ICD-10-CM

## 2021-08-18 DIAGNOSIS — E55.9 VITAMIN D DEFICIENCY: ICD-10-CM

## 2021-08-18 DIAGNOSIS — F17.210 CIGARETTE NICOTINE DEPENDENCE WITHOUT COMPLICATION: ICD-10-CM

## 2021-08-18 DIAGNOSIS — Z13.220 SCREENING FOR LIPID DISORDERS: ICD-10-CM

## 2021-08-18 DIAGNOSIS — R73.03 PREDIABETES: ICD-10-CM

## 2021-08-18 PROCEDURE — 99214 OFFICE O/P EST MOD 30 MIN: CPT | Performed by: NURSE PRACTITIONER

## 2021-08-18 RX ORDER — BUSPIRONE HYDROCHLORIDE 15 MG/1
15 TABLET ORAL 2 TIMES DAILY
Qty: 180 TABLET | Refills: 0 | Status: CANCELLED | OUTPATIENT
Start: 2021-08-18

## 2021-08-18 RX ORDER — CLONIDINE HYDROCHLORIDE 0.2 MG/1
0.2 TABLET ORAL
Qty: 90 TABLET | Refills: 0 | Status: CANCELLED | OUTPATIENT
Start: 2021-08-18

## 2021-08-18 NOTE — PROGRESS NOTES
Pt is here for   Chief Complaint   Patient presents with    Follow-up     HTN, A1C DOXY. -[id number]     1. Have you been to the ER, urgent care clinic since your last visit? Hospitalized since your last visit? No    2. Have you seen or consulted any other health care providers outside of the [address] [place] [address] since your last visit? Include any pap smears or colon screening.  No

## 2021-08-18 NOTE — PATIENT INSTRUCTIONS

## 2021-11-18 ENCOUNTER — OFFICE VISIT (OUTPATIENT)
Dept: INTERNAL MEDICINE CLINIC | Age: 51
End: 2021-11-18
Payer: MEDICAID

## 2021-11-18 VITALS
RESPIRATION RATE: 17 BRPM | SYSTOLIC BLOOD PRESSURE: 125 MMHG | TEMPERATURE: 98 F | BODY MASS INDEX: 25.92 KG/M2 | WEIGHT: 155.6 LBS | OXYGEN SATURATION: 97 % | HEIGHT: 65 IN | HEART RATE: 77 BPM | DIASTOLIC BLOOD PRESSURE: 80 MMHG

## 2021-11-18 DIAGNOSIS — Z12.11 SPECIAL SCREENING FOR MALIGNANT NEOPLASMS, COLON: ICD-10-CM

## 2021-11-18 DIAGNOSIS — Z01.419 WELL WOMAN EXAM WITH ROUTINE GYNECOLOGICAL EXAM: Primary | ICD-10-CM

## 2021-11-18 DIAGNOSIS — I10 ESSENTIAL HYPERTENSION: ICD-10-CM

## 2021-11-18 DIAGNOSIS — F17.210 CIGARETTE NICOTINE DEPENDENCE WITHOUT COMPLICATION: ICD-10-CM

## 2021-11-18 DIAGNOSIS — Z12.4 SCREENING FOR MALIGNANT NEOPLASM OF CERVIX: ICD-10-CM

## 2021-11-18 DIAGNOSIS — Z11.59 NEED FOR HEPATITIS C SCREENING TEST: ICD-10-CM

## 2021-11-18 DIAGNOSIS — Z12.31 ENCOUNTER FOR SCREENING MAMMOGRAM FOR MALIGNANT NEOPLASM OF BREAST: ICD-10-CM

## 2021-11-18 DIAGNOSIS — F41.9 ANXIETY: ICD-10-CM

## 2021-11-18 PROBLEM — Z90.711 S/P PARTIAL HYSTERECTOMY: Status: ACTIVE | Noted: 2021-11-18

## 2021-11-18 PROCEDURE — 99396 PREV VISIT EST AGE 40-64: CPT | Performed by: NURSE PRACTITIONER

## 2021-11-18 RX ORDER — BUSPIRONE HYDROCHLORIDE 15 MG/1
15 TABLET ORAL 2 TIMES DAILY
Qty: 180 TABLET | Refills: 1 | Status: SHIPPED | OUTPATIENT
Start: 2021-11-18

## 2021-11-18 RX ORDER — CLONIDINE HYDROCHLORIDE 0.2 MG/1
0.2 TABLET ORAL
Qty: 90 TABLET | Refills: 1 | Status: SHIPPED | OUTPATIENT
Start: 2021-11-18 | End: 2022-05-17 | Stop reason: SDUPTHER

## 2021-11-18 NOTE — PROGRESS NOTES
Pt is here for   Chief Complaint   Patient presents with    Well Woman     pap     1. Have you been to the ER, urgent care clinic since your last visit? Hospitalized since your last visit? No    2. Have you seen or consulted any other health care providers outside of the [address] [place] [address] since your last visit? Include any pap smears or colon screening.  No    Denies pain at this time

## 2021-11-18 NOTE — PATIENT INSTRUCTIONS
Pap Test: Care Instructions  Overview     The Pap test (also called a Pap smear) is a screening test for cancer of the cervix, which is the lower part of the uterus that opens into the vagina. The test can help your doctor find early changes in the cells that could lead to cancer. The sample of cells taken during your test has been sent to a lab so that an expert can look at the cells. It usually takes a week or two to get the results back. Follow-up care is a key part of your treatment and safety. Be sure to make and go to all appointments, and call your doctor if you are having problems. It's also a good idea to know your test results and keep a list of the medicines you take. What do the results mean? · A normal result means that the test did not find any abnormal cells in the sample. · An abnormal result can mean many things. Most of these are not cancer. The results of your test may be abnormal because:  ? You have an infection of the vagina or cervix, such as a yeast infection. ? You have an IUD (intrauterine device for birth control). ? You have low estrogen levels after menopause that are causing the cells to change. ? You have cell changes that may be a sign of precancer or cancer. The results are ranked based on how serious the changes might be. There are many other reasons why you might not get a normal result. If the results were abnormal, you may need to get another test within a few weeks or months. If the results show changes that could be a sign of cancer, you may need a test called a colposcopy, which provides a more complete view of the cervix. Sometimes the lab cannot use the sample because it does not contain enough cells or was not preserved well. If so, you may need to have the test again. This is not common, but it does happen from time to time. When should you call for help?   Watch closely for changes in your health, and be sure to contact your doctor if:    · You have vaginal bleeding or pain for more than 2 days after the test. It is normal to have a small amount of bleeding for a day or two after the test.   Where can you learn more? Go to http://www.gray.com/  Enter H218 in the search box to learn more about \"Pap Test: Care Instructions. \"  Current as of: December 17, 2020               Content Version: 13.0  © 2006-2021 [employer]. Care instructions adapted under license by [employer] (which disclaims liability or warranty for this information). If you have questions about a medical condition or this instruction, always ask your healthcare professional. [place] [address] any warranty or liability for your use of this information. Mammogram: About This Test  What is it? A mammogram is an X-ray of the breast that is used to screen for breast cancer. This test can find tumors that are too small for you or your doctor to feel. Cancer is most easily treated when it is found at an early stage. Why is this test done? A mammogram is done to:  · Look for breast cancer when there are no symptoms. · Find breast cancer when there are symptoms. Symptoms of breast cancer may include a lump or thickening in the breast, nipple discharge, or dimpling of the skin on one area of the breast.  · Find an area of suspicious breast tissue to remove for an exam under a microscope (biopsy). How do you prepare for the test?  If you've had a mammogram before at another clinic, have the results sent or bring them with you to your appointment. On the day of the mammogram, don't use any deodorant. And don't use perfume, powders, or ointments near or on your breasts. The residue left on your skin by these substances may interfere with the X-rays. How is the test done? · You will need to take off any jewelry that might interfere with the X-ray pictures.   · You will need to take off your clothes above the waist.  · You will be given a cloth or paper gown to use during the test.  · You probably will stand during the mammogram.  · One at a time, your breasts will be placed on a flat plate. · Another plate is then pressed firmly against your breast to help flatten out the breast tissue. You may be asked to lift your arm. · For a few seconds while the X-ray picture is being taken, you will need to hold your breath. · At least two pictures are taken of each breast. One is taken from the top and one from the side. How does having a mammogram feel? A mammogram is often uncomfortable but rarely painful. If you have sensitive or fragile skin or a skin condition, let the technician know before you have your exam. If you have menstrual periods, the procedure is more comfortable when done within 2 weeks after your period has ended. Having your breasts flattened is usually uncomfortable, but it helps the technician get the best images. How long does the test take? · The test will take about 10 to 15 minutes. You may be in the clinic for up to an hour. · You may be asked to wait a few minutes while the images are checked to make sure they don't need to be redone. What happens after the test?  · You will probably be able to go home right away. · You can go back to your usual activities right away. Follow-up care is a key part of your treatment and safety. Be sure to make and go to all appointments, and call your doctor if you are having problems. It's also a good idea to keep a list of the medicines you take. Ask your doctor when you can expect to have your test results. Where can you learn more? Go to http://www.[employer].com/  Enter Z238 in the search box to learn more about \"Mammogram: About This Test.\"  Current as of: December 17, 2020               Content Version: 13.0  © 7169-9523 [employer].    Care instructions adapted under license by [employer] (which disclaims liability or warranty for this information). If you have questions about a medical condition or this instruction, always ask your healthcare professional. [place] [address] any warranty or liability for your use of this information.

## 2021-11-18 NOTE — PROGRESS NOTES
Subjective:   49 y.o. female for Well Woman Check. She is postmenopausal.  Social History: single partner, contraception - status post hysterectomy. Pertinent past medical hstory: see PMH. Patient Active Problem List   Diagnosis Code    Anxiety F41.9    Eczema L30.9    Vitamin D deficiency E55.9     Patient Active Problem List    Diagnosis Date Noted    Vitamin D deficiency 01/16/2017    Eczema 01/12/2017    Anxiety 11/16/2015     Current Outpatient Medications   Medication Sig Dispense Refill    cloNIDine HCL (CATAPRES) 0.2 mg tablet Take 1 Tablet by mouth nightly. 90 Tablet 1    busPIRone (BUSPAR) 15 mg tablet Take 1 Tablet by mouth two (2) times a day. 180 Tablet 1     No Known Allergies  History reviewed. No pertinent past medical history. Past Surgical History:   Procedure Laterality Date    HX PARTIAL HYSTERECTOMY       Family History   Problem Relation Age of Onset    Cancer Mother         cancer of the blood    Diabetes Maternal Aunt      Social History     Tobacco Use    Smoking status: Current Every Day Smoker     Packs/day: 0.50    Smokeless tobacco: Never Used   Substance Use Topics    Alcohol use: Yes     Alcohol/week: 0.0 standard drinks     Comment: social        ROS:  Feeling well. No dyspnea or chest pain on exertion. No abdominal pain, change in bowel habits, black or bloody stools. No urinary tract symptoms. GYN ROS: no breast pain or new or enlarging lumps on self exam, no vaginal bleeding, no discharge or pelvic pain. Menopausal symptoms: none. No neurological complaints.     Last DEXA scan and T-score: none  Last Cologuard: 2019   Last Mammogram: 2018    Objective:     Visit Vitals  /80 (BP 1 Location: Left upper arm, BP Patient Position: Sitting, BP Cuff Size: Large adult)   Pulse 77   Temp 98 °F (36.7 °C) (Temporal)   Resp 17   Ht 5' 5\" (1.651 m)   Wt 155 lb 9.6 oz (70.6 kg)   SpO2 97%   BMI 25.89 kg/m²     The patient appears well, alert, oriented x 3, in no distress. ENT normal.  Neck supple. No adenopathy or thyromegaly. EDUARDO. Lungs are clear, good air entry, no wheezes, rhonchi or rales. S1 and S2 normal, no murmurs, regular rate and rhythm. Abdomen soft without tenderness, guarding, mass or organomegaly. Extremities show no edema, normal peripheral pulses. Neurological is normal, no focal findings. BREAST EXAM: breasts appear normal, no suspicious masses, no skin or nipple changes or axillary nodes    PELVIC EXAM: VAGINA: normal appearing vagina with normal color and discharge, no lesions, CERVIX: surgically absent, UTERUS: surgically absent, vaginal cuff well healed, PAP: Pap smear done today, thin-prep method    Assessment/Plan:   well woman  SURGICALLY postmenopausal  mammogram  pap smear  counseled on breast self exam, mammography screening and adequate intake of calcium and vitamin D  additional lab tests per orders  return annually or prn    ICD-10-CM ICD-9-CM    1. Well woman exam with routine gynecological exam  Z01.419 V72.31 [facility] MAMMO BI SCREENING INCL CAD      PAP IG, APTIMA HPV AND RFX 16/18,45 ([id number])    [V72.31]   2. Cigarette nicotine dependence without complication  P36.793 193.9 cloNIDine HCL (CATAPRES) 0.2 mg tablet   3. Essential hypertension  I10 401.9 cloNIDine HCL (CATAPRES) 0.2 mg tablet   4. Anxiety  F41.9 300.00 busPIRone (BUSPAR) 15 mg tablet   5. Need for hepatitis C screening test  Z11.59 V73.89 HCV RNA BY PCR W/REFL GENOTYPE   6. Screening for malignant neoplasm of cervix  Z12.4 V76.2 PAP IG, APTIMA HPV AND RFX 16/18,45 ([id number])   7. Encounter for screening mammogram for malignant neoplasm of breast  Z12.31 V76.12 [facility] MAMMO BI SCREENING INCL CAD   8. Special screening for malignant neoplasms, colon  Z12.11 V76.51 REFERRAL TO GASTROENTEROLOGY   .

## 2021-11-29 LAB
CYTOLOGIST CVX/VAG CYTO: NORMAL
CYTOLOGY CVX/VAG DOC CYTO: NORMAL
CYTOLOGY CVX/VAG DOC THIN PREP: NORMAL
DX ICD CODE: NORMAL
HPV I/H RISK 4 DNA CVX QL PROBE+SIG AMP: NEGATIVE
Lab: NORMAL
OTHER STN SPEC: NORMAL
STAT OF ADQ CVX/VAG CYTO-IMP: NORMAL

## 2022-03-19 PROBLEM — Z90.711 S/P PARTIAL HYSTERECTOMY: Status: ACTIVE | Noted: 2021-11-18

## 2022-03-19 PROBLEM — L30.9 ECZEMA: Status: ACTIVE | Noted: 2017-01-12

## 2022-03-19 PROBLEM — E55.9 VITAMIN D DEFICIENCY: Status: ACTIVE | Noted: 2017-01-16

## 2022-05-19 ENCOUNTER — OFFICE VISIT (OUTPATIENT)
Dept: INTERNAL MEDICINE CLINIC | Age: 52
End: 2022-05-19
Payer: MEDICAID

## 2022-05-19 VITALS
HEART RATE: 79 BPM | OXYGEN SATURATION: 96 % | SYSTOLIC BLOOD PRESSURE: 116 MMHG | DIASTOLIC BLOOD PRESSURE: 87 MMHG | WEIGHT: 160 LBS | HEIGHT: 65 IN | TEMPERATURE: 98.7 F | BODY MASS INDEX: 26.66 KG/M2 | RESPIRATION RATE: 18 BRPM

## 2022-05-19 DIAGNOSIS — R73.03 PREDIABETES: ICD-10-CM

## 2022-05-19 DIAGNOSIS — E78.2 MIXED HYPERLIPIDEMIA: Primary | ICD-10-CM

## 2022-05-19 DIAGNOSIS — R10.31 ACUTE RIGHT LOWER QUADRANT PAIN: ICD-10-CM

## 2022-05-19 DIAGNOSIS — F17.210 CIGARETTE NICOTINE DEPENDENCE WITHOUT COMPLICATION: ICD-10-CM

## 2022-05-19 DIAGNOSIS — E78.1 HYPERTRIGLYCERIDEMIA WITHOUT HYPERCHOLESTEROLEMIA: ICD-10-CM

## 2022-05-19 LAB
CHOLEST SERPL-MCNC: 226 MG/DL
HBA1C MFR BLD HPLC: 6 %
HDLC SERPL-MCNC: 52 MG/DL
LDL CHOLESTEROL POC: 145 MG/DL
NON-HDL GOAL (POC): 175
TCHOL/HDL RATIO (POC): 4.4
TRIGL SERPL-MCNC: 148 MG/DL

## 2022-05-19 PROCEDURE — 80061 LIPID PANEL: CPT | Performed by: NURSE PRACTITIONER

## 2022-05-19 PROCEDURE — 99214 OFFICE O/P EST MOD 30 MIN: CPT | Performed by: NURSE PRACTITIONER

## 2022-05-19 PROCEDURE — 83036 HEMOGLOBIN GLYCOSYLATED A1C: CPT | Performed by: NURSE PRACTITIONER

## 2022-05-19 RX ORDER — ACETAMINOPHEN 500 MG
1000 TABLET ORAL
Qty: 100 TABLET | Refills: 11 | Status: SHIPPED | OUTPATIENT
Start: 2022-05-19

## 2022-05-19 RX ORDER — DICLOFENAC SODIUM 75 MG/1
75 TABLET, DELAYED RELEASE ORAL
Qty: 60 TABLET | Refills: 2 | Status: SHIPPED | OUTPATIENT
Start: 2022-05-19

## 2022-05-19 RX ORDER — ATORVASTATIN CALCIUM 10 MG/1
10 TABLET, FILM COATED ORAL
Qty: 90 TABLET | Refills: 3 | Status: SHIPPED | OUTPATIENT
Start: 2022-05-19

## 2022-05-19 NOTE — PATIENT INSTRUCTIONS
You can try including these foods in your diet to naturally boost your sexual desire:    Watermelon (natural viagra)  Ginseng ([place] Red)  Honey  Garlic  Asparagus (libido)  Almonds (libido)  Bananas  Eggs  Oysters  Swiss/Ricotta cheese  Cabbage (lowers estrogen)        Decreased Female Libido: Care Instructions  Overview     A decreased libido means you have less desire to have sex. It may be hard for you to get sexually excited or have an orgasm. This is a common problem. Many things can cause this problem. Often, there's more than one cause. In some cases, your sex life may be affected by normal changes in your body. These include having a baby and going through menopause. This problem can also be caused by a medicine you take. Or your vagina could be dry. Or you might have a vaginal infection. And sometimes, there may be issues between you and your partner that affect your sex drive. Your doctor may do tests to see if you have a vaginal infection. The doctor may ask you about your sex life. It's important to trust your doctor. Try to be honest about your feelings toward sex. Your sex partner may want to be involved with your treatment. Many people can have a healthy sex drive again after the problem is found. Medicines for depression can affect your sex drive. If you are taking any, ask your doctor about changing them. Follow-up care is a key part of your treatment and safety. Be sure to make and go to all appointments, and call your doctor if you are having problems. It's also a good idea to know your test results and keep a list of the medicines you take. How can you care for yourself at home? · Tell your doctor about all the medicines you take. This includes vitamins, supplements, and herbal remedies. · Use vaginal lubricant during sex. Examples are Astroglide and K-Y Jelly. · Increase the time you and your partner spend touching each other before sex. This is called foreplay.   · Before sex, take a warm bath. This can relax you and reduce stress or anxiety. · Try types of sexual activity other than intercourse. · Be honest with your sex partner about what you enjoy during sex. When should you call for help? Watch closely for changes in your health, and be sure to contact your doctor if you have any problems. Where can you learn more? Go to http://www.gray.com/  Enter J925 in the search box to learn more about \"Decreased Female Libido: Care Instructions. \"  Current as of: November 22, 2021               Content Version: 13.2  © 8863-9710 [employer]. Care instructions adapted under license by [employer] (which disclaims liability or warranty for this information). If you have questions about a medical condition or this instruction, always ask your healthcare professional. [address] any warranty or liability for your use of this information. Try some LONG compression shorts, capris, or leggings for leg pain. Try the [facility]. You may also use the tylenol or diclofenac I sent to help with your pain. Learning About the [address] Diet  What is the Mediterranean diet? The Mediterranean diet is a style of eating rather than a diet plan. It features foods eaten in [place], [place], [place] and [place], and other countries along the [place]. It emphasizes eating foods like fish, fruits, vegetables, beans, high-fiber breads and whole grains, nuts, and olive oil. This style of eating includes limited red meat, cheese, and sweets. Why choose the Mediterranean diet? A Mediterranean-style diet may improve heart health. It contains more fat than other heart-healthy diets. But the fats are mainly from nuts, unsaturated oils (such as fish oils and olive oil), and certain nut or seed oils (such as canola, soybean, or flaxseed oil). These fats may help protect the heart and blood vessels.   How can you get started on the Mediterranean diet? Here are some things you can do to switch to a more Mediterranean way of eating. What to eat  · Eat a variety of fruits and vegetables each day, such as grapes, blueberries, tomatoes, broccoli, peppers, figs, olives, spinach, eggplant, beans, lentils, and chickpeas. · Eat a variety of whole-grain foods each day, such as oats, brown rice, and whole wheat bread, pasta, and couscous. · Eat fish at least 2 times a week. Try tuna, salmon, mackerel, lake trout, herring, or sardines. · Eat moderate amounts of low-fat dairy products, such as milk, cheese, or yogurt. · Eat moderate amounts of poultry and eggs. · Choose healthy (unsaturated) fats, such as nuts, olive oil, and certain nut or seed oils like canola, soybean, and flaxseed. · Limit unhealthy (saturated) fats, such as butter, palm oil, and coconut oil. And limit fats found in animal products, such as meat and dairy products made with whole milk. Try to eat red meat only a few times a month in very small amounts. · Limit sweets and desserts to only a few times a week. This includes sugar-sweetened drinks like soda. The Mediterranean diet may also include red wine with your meal--1 glass each day for women and up to 2 glasses a day for men. Tips for eating at home  · Use herbs, spices, garlic, lemon zest, and citrus juice instead of salt to add flavor to foods. · Add avocado slices to your sandwich instead of bennett. · Have fish for lunch or dinner instead of red meat. Brush the fish with olive oil, and broil or grill it. · Sprinkle your salad with seeds or nuts instead of cheese. · Cook with olive or canola oil instead of butter or oils that are high in saturated fat. · Switch from 2% milk or whole milk to 1% or fat-free milk. · Dip raw vegetables in a vinaigrette dressing or hummus instead of dips made from mayonnaise or sour cream.  · Have a piece of fruit for dessert instead of a piece of cake.  Try baked apples, or have some dried fruit. Tips for eating out  · Try broiled, grilled, baked, or poached fish instead of having it fried or breaded. · Ask your  to have your meals prepared with olive oil instead of butter. · Order dishes made with marinara sauce or sauces made from olive oil. Avoid sauces made from cream or mayonnaise. · Choose whole-grain breads, whole wheat pasta and pizza crust, brown rice, beans, and lentils. · Cut back on butter or margarine on bread. Instead, you can dip your bread in a small amount of olive oil. · Ask for a side salad or grilled vegetables instead of french fries or chips. Where can you learn more? Go to http://www.wilson.com/  Enter O407 in the search box to learn more about \"Learning About the Mediterranean Diet. \"  Current as of: September 8, 2021               Content Version: 13.2  © 2006-2022 Healthwise, [facility]. Care instructions adapted under license by [employer] (which disclaims liability or warranty for this information). If you have questions about a medical condition or this instruction, always ask your healthcare professional. [address] any warranty or liability for your use of this information.

## 2022-05-19 NOTE — Clinical Note
Can you call and tell pt I started statin since LDL more elevated with HTN and smoking hx?  Need fu in 3 months

## 2022-05-19 NOTE — PROGRESS NOTES
Pt is here for   Chief Complaint   Patient presents with    Hypertension     follow up     Leg Pain     right leg      1. Have you been to the ER, urgent care clinic since your last visit? Hospitalized since your last visit? No    2. Have you seen or consulted any other health care providers outside of the [address] [place] [address] since your last visit? Include any pap smears or colon screening.  No    Denies pain at this time

## 2022-05-19 NOTE — PROGRESS NOTES
[address] [facility] (: 1970) is a 47 y.o. female, established patient, here for evaluation of the following chief complaint(s):  Hypertension (follow up ) and Leg Pain (right leg )       ASSESSMENT/PLAN:  Below is the assessment and plan developed based on review of pertinent history, physical exam, labs, studies, and medications. 1. Mixed hyperlipidemia  -     atorvastatin (LIPITOR) 10 mg tablet; Take 1 Tablet by mouth nightly. Indications: excessive fat in the blood, Normal, Disp-90 Tablet, R-3  2. Prediabetes  -     AMB POC HEMOGLOBIN A1C  3. Hypertriglyceridemia without hypercholesterolemia  -     AMB POC LIPID PROFILE  4. Acute right lower quadrant pain  -     acetaminophen (TYLENOL) 500 mg tablet; Take 2 Tablets by mouth three (3) times daily as needed for Pain., Normal, Disp-100 Tablet, R-11  -     diclofenac EC (VOLTAREN) 75 mg EC tablet; Take 1 Tablet by mouth two (2) times daily as needed for Pain. With food, Normal, Disp-60 Tablet, R-2  5. Cigarette nicotine dependence without complication      Return in about 6 months (around 2022) for OV- HTN, ALBERTO, annual labs. Needs VV in 3 months fot HLD. Pt asked to complete follow by next visit: continue present plan, routine labs ordered, call if any problems. STATIN started as LDL at 145 while treating for HTN. Needs VV in 3 months for repeat labs following med start. SUBJECTIVE/OBJECTIVE:  HPI    Pt presents to f/u HTN and ALBERTO. Taking clonidine and buspar usually once daily. Denies side effects from medication. Feels good, but concerned for low libido. No acute complaints otherwise. BP Readings from Last 3 Encounters:   22 116/87   21 125/80   21 (!) 124/92     Having right leg soreness for past 2 weeks. Works as  for [employer]. Has more stops on route lately.      Review of Systems  Constitutional: negative for fevers, chills, anorexia and weight loss  Respiratory:  negative for cough, hemoptysis, dyspnea, and wheezing  CV:   negative for chest pain, palpitations, and lower extremity edema  GI:   negative for nausea, vomiting, diarrhea, abdominal pain, and melena  Endo:               negative for polyuria,polydipsia,polyphagia, and heat intolerance  Genitourinary: negative for frequency, urgency, dysuria, retention, and hematuria  Integument:  negative for rash, ulcerations, and pruritus  Hematologic:  negative for easy bruising and bleeding  Musculoskel: negative for arthralgias, muscle weakness,and joint pain/swelling  Neurological:  negative for headaches, dizziness, vertigo,and memory/gait problems  Behavl/Psych: negative for feelings of anxiety, depression, suicide, and mood changes    Visit Vitals  /87 (BP 1 Location: Left upper arm, BP Patient Position: Sitting, BP Cuff Size: Large adult)   Pulse 79   Temp 98.7 °F (37.1 °C) (Temporal)   Resp 18   Ht 5' 5\" (1.651 m)   Wt 160 lb (72.6 kg)   SpO2 96%   BMI 26.63 kg/m²       Wt Readings from Last 3 Encounters:   05/19/22 160 lb (72.6 kg)   11/18/21 155 lb 9.6 oz (70.6 kg)   05/27/21 150 lb (68 kg)         Physical Exam:   General appearance - alert, well appearing, and in no distress. Mental status - A/O x 4,normal mood and affect. Chest - CTA. Symmetric chest rise. No wheezing. No distress. Heart - Normal rate & rhythm. Normal S1 & S2. No MGR. Abdomen- Soft, round. Non-distended, NT. No pulsatile masses or hernias. Ext-  No pedal edema, clubbing, or cyanosis. Skin-Warm and dry. No hyperpigmentation, ulcerations, or suspicious lesions. Neuro - Normal speech, no focal findings or movement disorder. Normal strength, gait, and muscle tone.      Results for orders placed or performed in visit on 05/19/22   AMB POC LIPID PROFILE   Result Value Ref Range    Cholesterol (POC) 226     Triglycerides (POC) 148     HDL Cholesterol (POC) 52     LDL Cholesterol (POC) 145 MG/DL    Non-HDL Goal (POC) 175     TChol/HDL Ratio (POC) 4.4    AMB POC HEMOGLOBIN A1C   Result Value Ref Range    Hemoglobin A1c (POC) 6.0 %           An electronic signature was used to authenticate this note.   -- Alessandra Gonzalez NP

## 2022-06-20 NOTE — PROGRESS NOTES
Muna Simons is a 51 y.o. female and presents with Hypertension (HTN)    Subjective:  Pt here to f/u anxiety and HTN med start. Taking Clonidine & Buspar. Feels better, still sleep well. No acute complaints otherwise. Denies side effects from medication. Still trying to quit, down to 1/3 ppd. No leg swelling, CP, or SOB. BP Readings from Last 3 Encounters:   11/15/19 131/84   08/01/19 131/84   07/05/19 133/74     Review of Systems  Constitutional: negative for fevers, chills, anorexia and weight loss  Respiratory:  negative for cough, hemoptysis, dyspnea, and wheezing  CV:   negative for chest pain, palpitations, and lower extremity edema  GI:   negative for nausea, vomiting, diarrhea, abdominal pain, and melena  Endo:               negative for polyuria,polydipsia,polyphagia, and heat intolerance  Genitourinary: negative for frequency, urgency, dysuria, retention, and hematuria  Integument:  negative for rash, ulcerations, and pruritus  Hematologic:  negative for easy bruising and bleeding  Musculoskel: negative for arthralgias, muscle weakness,and joint pain/swelling  Neurological:  negative for headaches, dizziness, vertigo,and memory/gait problems  Behavl/Psych: negative for feelings of depression, suicide, and mood changes    History reviewed. No pertinent past medical history. Past Surgical History:   Procedure Laterality Date    HX PARTIAL HYSTERECTOMY       Social History     Socioeconomic History    Marital status: SINGLE     Spouse name: Not on file    Number of children: Not on file    Years of education: Not on file    Highest education level: Not on file   Tobacco Use    Smoking status: Current Every Day Smoker    Smokeless tobacco: Never Used   Substance and Sexual Activity    Alcohol use:  Yes     Alcohol/week: 0.0 standard drinks     Comment: social    Drug use: No    Sexual activity: Yes     Partners: Male     Birth control/protection: None     Family History   Problem Relation Age of Onset    Cancer Mother         cancer of the blood    Diabetes Maternal Aunt      Current Outpatient Medications   Medication Sig Dispense Refill    cloNIDine HCl (CATAPRES) 0.1 mg tablet Take 1 Tab by mouth nightly. 90 Tab 3    busPIRone (BUSPAR) 15 mg tablet Take 1 Tab by mouth two (2) times a day. 180 Tab 3    nicotine (NICODERM CQ) 14 mg/24 hr patch 1 Patch by TransDERmal route every twenty-four (24) hours. 30 Patch 5    Cholecalciferol, Vitamin D3, (VITAMIN D3) 2,000 unit cap capsule Take 2,000 Units by mouth two (2) times a day. Indications: VITAMIN D DEFICIENCY (HIGH DOSE THERAPY) 60 Cap 11     No Known Allergies    Objective:  Visit Vitals  /84 (BP 1 Location: Right arm, BP Patient Position: Sitting)   Pulse 78   Temp 96.7 °F (35.9 °C) (Oral)   Resp 18   Ht 5' 6\" (1.676 m)   Wt 166 lb (75.3 kg)   SpO2 100%   BMI 26.79 kg/m²     Wt Readings from Last 3 Encounters:   11/15/19 166 lb (75.3 kg)   08/01/19 161 lb (73 kg)   07/05/19 160 lb (72.6 kg)     Physical Exam:   General appearance - alert, well appearing, and in no distress. Mental status - A/O x 4, normal mood and affect. Neck -Supple ,normal CSP. FROM, non-tender. No significant adenopathy/thyromegaly. No JVD. Chest - CTA. Symmetric chest rise. No wheezing, rales or rhonchi. Heart - Normal rate, regular rhythm. Normal S1, S2. No MGR or clicks. Abdomen - Soft,non-distended. Normoactive BS in all quadrants. NT, no mass or HSM. Ext- Radial, DP pulses, 2+ bilaterally. No pedal edema, clubbing, or cyanosis. Skin-Warm and dry. No hyperpigmentation, ulcerations, or suspicious lesions. Neuro - Normal speech, no focal findings or movement disorder. Normal strength, gait, and muscle tone. Assessment/Plan:  The current medical regimen is effective;  continue present plan and medications.   Medication Side Effects and Warnings were discussed with patient: yes   Patient Labs were reviewed: yes  Patient Past Records were reviewed: yes    See below for other orders   Follow-up and Dispositions    · Return in about 5 months (around 4/8/2020) for [address], HTN.           ICD-10-CM ICD-9-CM    1. Essential hypertension I10 401.9    2. Cigarette nicotine dependence without complication J92.164 545.4    3. Anxiety F41.9 300.00      No orders of the defined types were placed in this encounter. Dominic Erwin expressed understanding of plan. An After Visit Summary was offered/printed and given to the patient. no

## 2022-12-08 DIAGNOSIS — F41.9 ANXIETY: ICD-10-CM

## 2022-12-08 RX ORDER — BUSPIRONE HYDROCHLORIDE 15 MG/1
TABLET ORAL
Qty: 60 TABLET | Refills: 0 | Status: SHIPPED | OUTPATIENT
Start: 2022-12-08

## 2023-01-20 ENCOUNTER — OFFICE VISIT (OUTPATIENT)
Dept: INTERNAL MEDICINE CLINIC | Age: 53
End: 2023-01-20
Payer: MEDICAID

## 2023-01-20 ENCOUNTER — HOSPITAL ENCOUNTER (OUTPATIENT)
Dept: GENERAL RADIOLOGY | Age: 53
Discharge: HOME | End: 2023-01-20
Payer: MEDICAID

## 2023-01-20 VITALS
HEIGHT: 65 IN | DIASTOLIC BLOOD PRESSURE: 83 MMHG | WEIGHT: 159 LBS | RESPIRATION RATE: 18 BRPM | SYSTOLIC BLOOD PRESSURE: 120 MMHG | OXYGEN SATURATION: 100 % | HEART RATE: 72 BPM | BODY MASS INDEX: 26.49 KG/M2 | TEMPERATURE: 97.9 F

## 2023-01-20 DIAGNOSIS — E78.2 MIXED HYPERLIPIDEMIA: Primary | ICD-10-CM

## 2023-01-20 DIAGNOSIS — I10 ESSENTIAL HYPERTENSION: ICD-10-CM

## 2023-01-20 DIAGNOSIS — M25.561 ACUTE PAIN OF RIGHT KNEE: ICD-10-CM

## 2023-01-20 DIAGNOSIS — M79.661 PAIN IN RIGHT SHIN: ICD-10-CM

## 2023-01-20 DIAGNOSIS — R73.03 PREDIABETES: ICD-10-CM

## 2023-01-20 DIAGNOSIS — F41.9 ANXIETY: ICD-10-CM

## 2023-01-20 PROCEDURE — 73562 X-RAY EXAM OF KNEE 3: CPT

## 2023-01-20 RX ORDER — BUSPIRONE HYDROCHLORIDE 15 MG/1
15 TABLET ORAL 2 TIMES DAILY
Qty: 180 TABLET | Refills: 3 | Status: SHIPPED | OUTPATIENT
Start: 2023-01-20

## 2023-01-20 NOTE — PROGRESS NOTES
Pt is here for   Chief Complaint   Patient presents with    Leg Pain     More of the shin area. . pt states discomfort     1. Have you been to the ER, urgent care clinic since your last visit? Hospitalized since your last visit? No    2. Have you seen or consulted any other health care providers outside of the [address] [place] [address] since your last visit? Include any pap smears or colon screening.  No    Denies pain at this time

## 2023-01-20 NOTE — PROGRESS NOTES
[address] [place] [address] (: 1970) is a 47 y.o. female, established patient, here for evaluation of the following chief complaint(s):  Leg Pain (More of the shin area. . pt states discomfort)       ASSESSMENT/PLAN:  Below is the assessment and plan developed based on review of pertinent history, physical exam, labs, studies, and medications. 1. Mixed hyperlipidemia  -     LIPID PANEL  2. Acute pain of right knee  -     XR KNEE RT 3 V; Future  3. Pain in right shin  4. Essential hypertension  -     METABOLIC PANEL, COMPREHENSIVE  -     CBC WITH AUTOMATED DIFF  5. Prediabetes  -     HEMOGLOBIN A1C WITH EAG      Return in about 6 months (around 2023) for OV-HTN, CHOL, preDM. Pt asked to complete follow by next visit: continue present plan taking    Current Outpatient Medications:     busPIRone (BUSPAR) 15 mg tablet, Take 1 tablet by mouth twice daily, Disp: 60 Tablet, Rfl: 0    cloNIDine HCL (CATAPRES) 0.2 mg tablet, Take 1 tablet by mouth nightly, Disp: 90 Tablet, Rfl: 3    acetaminophen (TYLENOL) 500 mg tablet, Take 2 Tablets by mouth three (3) times daily as needed for Pain., Disp: 100 Tablet, Rfl: 11    diclofenac EC (VOLTAREN) 75 mg EC tablet, Take 1 Tablet by mouth two (2) times daily as needed for Pain. With food, Disp: 60 Tablet, Rfl: 2    atorvastatin (LIPITOR) 10 mg tablet, Take 1 Tablet by mouth nightly. Indications: excessive fat in the blood, Disp: 90 Tablet, Rfl: 3      SUBJECTIVE/OBJECTIVE:  HPI    Pt presents to f/u HTN, CHOL, and ALBERTO. Taking clonidine, statin and buspar usually once daily. Denies side effects from medication. Feels good. No report of unilateral weakness, headaches, blurring vision, CP, SOB,or  leg swelling.    BP Readings from Last 3 Encounters:   23 120/83   22 116/87   21 125/80     Lab Results   Component Value Date/Time    Cholesterol, total 213 (H) 2021 11:20 AM    Cholesterol (POC) 226 2022 11:00 AM    HDL Cholesterol 70 2021 11:20 AM HDL Cholesterol (POC) 52 05/19/2022 11:00 AM    LDL Cholesterol (POC) 145 05/19/2022 11:00 AM    LDL, calculated 124.8 (H) 11/18/2021 11:20 AM    VLDL, calculated 18.2 11/18/2021 11:20 AM    Triglyceride 91 11/18/2021 11:20 AM    Triglycerides (POC) 148 05/19/2022 11:00 AM    CHOL/HDL Ratio 3.0 11/18/2021 11:20 AM     Having right shin pain for past few weeks. Works at [employer] with walking and standing often. Waxes and wanes. Affecting area above knee at times also. Review of Systems  Constitutional: negative for fevers, chills, anorexia and weight loss  Respiratory:  negative for cough, hemoptysis, dyspnea, and wheezing  CV:   negative for chest pain, palpitations, and lower extremity edema  GI:   negative for nausea, vomiting, diarrhea, abdominal pain, and melena  Endo:               negative for polyuria,polydipsia,polyphagia, and heat intolerance  Genitourinary: negative for frequency, urgency, dysuria, retention, and hematuria  Integument:  negative for rash, ulcerations, and pruritus  Hematologic:  negative for easy bruising and bleeding  Musculoskel: negative for arthralgias, muscle weakness,and joint pain/swelling  Neurological:  negative for headaches, dizziness, vertigo,and memory/gait problems  Behavl/Psych: negative for feelings of anxiety, depression, suicide, and mood changes    Visit Vitals  /83 (BP 1 Location: Left upper arm, BP Patient Position: Sitting, BP Cuff Size: Large adult)   Pulse 72   Temp 97.9 °F (36.6 °C) (Temporal)   Resp 18   Ht 5' 5\" (1.651 m)   Wt 159 lb (72.1 kg)   SpO2 100%   BMI 26.46 kg/m²       Wt Readings from Last 3 Encounters:   01/20/23 159 lb (72.1 kg)   05/19/22 160 lb (72.6 kg)   11/18/21 155 lb 9.6 oz (70.6 kg)         Physical Exam:   General appearance - alert, well appearing, and in no distress. Mental status - A/O x 4,normal mood and affect. Chest - CTA. Symmetric chest rise. No wheezing. No distress. Heart - Normal rate & rhythm.  Normal S1 & S2. No MGR. Abdomen- Soft, round. Non-distended, NT. No pulsatile masses or hernias. Ext-  No pedal edema, clubbing, or cyanosis. Skin-Warm and dry. No hyperpigmentation, ulcerations, or suspicious lesions. Neuro - Normal speech, no focal findings or movement disorder. Normal strength, gait, and muscle tone. Knee- FROM. No TTP, erythema, effusions, or crepitus. Shins NT, no deformity noted or bruising. An electronic signature was used to authenticate this note.   -- Ulysses Palau, SATHISH

## 2023-01-21 LAB
ALBUMIN SERPL-MCNC: 4.3 G/DL (ref 3.8–4.9)
ALBUMIN/GLOB SERPL: 1.3 {RATIO} (ref 1.2–2.2)
ALP SERPL-CCNC: 78 IU/L (ref 44–121)
ALT SERPL-CCNC: 13 IU/L (ref 0–32)
AST SERPL-CCNC: 16 IU/L (ref 0–40)
BASOPHILS # BLD AUTO: 0 X10E3/UL (ref 0–0.2)
BASOPHILS NFR BLD AUTO: 1 %
BILIRUB SERPL-MCNC: 0.3 MG/DL (ref 0–1.2)
BUN SERPL-MCNC: 12 MG/DL (ref 6–24)
BUN/CREAT SERPL: 16 (ref 9–23)
CALCIUM SERPL-MCNC: 9.4 MG/DL (ref 8.7–10.2)
CHLORIDE SERPL-SCNC: 108 MMOL/L (ref 96–106)
CHOLEST SERPL-MCNC: 206 MG/DL (ref 100–199)
CO2 SERPL-SCNC: 24 MMOL/L (ref 20–29)
CREAT SERPL-MCNC: 0.74 MG/DL (ref 0.57–1)
EGFR: 98 ML/MIN/1.73
EOSINOPHIL # BLD AUTO: 0 X10E3/UL (ref 0–0.4)
EOSINOPHIL NFR BLD AUTO: 1 %
ERYTHROCYTE [DISTWIDTH] IN BLOOD BY AUTOMATED COUNT: 11.8 % (ref 11.7–15.4)
EST. AVERAGE GLUCOSE BLD GHB EST-MCNC: 128 MG/DL
GLOBULIN SER CALC-MCNC: 3.2 G/DL (ref 1.5–4.5)
GLUCOSE SERPL-MCNC: 105 MG/DL (ref 70–99)
HBA1C MFR BLD: 6.1 % (ref 4.8–5.6)
HCT VFR BLD AUTO: 39.4 % (ref 34–46.6)
HDLC SERPL-MCNC: 55 MG/DL
HGB BLD-MCNC: 13.1 G/DL (ref 11.1–15.9)
IMM GRANULOCYTES # BLD AUTO: 0 X10E3/UL (ref 0–0.1)
IMM GRANULOCYTES NFR BLD AUTO: 0 %
IMP & REVIEW OF LAB RESULTS: NORMAL
LDLC SERPL CALC-MCNC: 135 MG/DL (ref 0–99)
LYMPHOCYTES # BLD AUTO: 2 X10E3/UL (ref 0.7–3.1)
LYMPHOCYTES NFR BLD AUTO: 40 %
MCH RBC QN AUTO: 29.1 PG (ref 26.6–33)
MCHC RBC AUTO-ENTMCNC: 33.2 G/DL (ref 31.5–35.7)
MCV RBC AUTO: 88 FL (ref 79–97)
MONOCYTES # BLD AUTO: 0.3 X10E3/UL (ref 0.1–0.9)
MONOCYTES NFR BLD AUTO: 5 %
NEUTROPHILS # BLD AUTO: 2.8 X10E3/UL (ref 1.4–7)
NEUTROPHILS NFR BLD AUTO: 53 %
PLATELET # BLD AUTO: 291 X10E3/UL (ref 150–450)
POTASSIUM SERPL-SCNC: 4.5 MMOL/L (ref 3.5–5.2)
PROT SERPL-MCNC: 7.5 G/DL (ref 6–8.5)
RBC # BLD AUTO: 4.5 X10E6/UL (ref 3.77–5.28)
SODIUM SERPL-SCNC: 145 MMOL/L (ref 134–144)
TRIGL SERPL-MCNC: 89 MG/DL (ref 0–149)
VLDLC SERPL CALC-MCNC: 16 MG/DL (ref 5–40)
WBC # BLD AUTO: 5.2 X10E3/UL (ref 3.4–10.8)

## 2023-01-23 DIAGNOSIS — E78.2 MIXED HYPERLIPIDEMIA: ICD-10-CM

## 2023-01-23 RX ORDER — ATORVASTATIN CALCIUM 20 MG/1
20 TABLET, FILM COATED ORAL
Qty: 90 TABLET | Refills: 3 | Status: SHIPPED | OUTPATIENT
Start: 2023-01-23

## 2023-02-14 NOTE — DISCHARGE INSTRUCTIONS
It was a pleasure taking care of you in our Emergency Department today. We know that when you come to [facility], you are entrusting us with your health, comfort, and safety. Our physicians and nurses honor that trust, and truly appreciate the opportunity to care for you and your loved ones. We also value your feedback. If you receive a survey about your Emergency Department experience today, please fill it out. We care about our patients' feedback, and we listen to what you have to say. Thank you! Ketoconazole Pregnancy And Lactation Text: This medication is Pregnancy Category C and it isn't know if it is safe during pregnancy. It is also excreted in breast milk and breast feeding isn't recommended.

## 2023-04-27 DIAGNOSIS — F17.210 CIGARETTE NICOTINE DEPENDENCE WITHOUT COMPLICATION: ICD-10-CM

## 2023-04-27 DIAGNOSIS — I10 ESSENTIAL HYPERTENSION: ICD-10-CM

## 2023-04-27 RX ORDER — CLONIDINE HYDROCHLORIDE 0.2 MG/1
0.2 TABLET ORAL
Qty: 90 TABLET | Refills: 0 | Status: SHIPPED | OUTPATIENT
Start: 2023-04-27

## 2023-05-02 ENCOUNTER — TELEPHONE (OUTPATIENT)
Dept: INTERNAL MEDICINE CLINIC | Age: 53
End: 2023-05-02

## 2023-05-16 ENCOUNTER — TELEPHONE (OUTPATIENT)
Facility: CLINIC | Age: 53
End: 2023-05-16

## 2023-05-16 DIAGNOSIS — I10 ESSENTIAL (PRIMARY) HYPERTENSION: Primary | ICD-10-CM

## 2023-05-16 RX ORDER — CLONIDINE HYDROCHLORIDE 0.2 MG/1
0.2 TABLET ORAL
Qty: 90 TABLET | Refills: 0 | Status: SHIPPED | OUTPATIENT
Start: 2023-05-16

## 2023-05-16 NOTE — TELEPHONE ENCOUNTER
Pt is calling to check on status of the clonidine. You stated you wer going to refill it this time for her.   Pt #[phone]

## 2023-07-26 ENCOUNTER — TELEMEDICINE (OUTPATIENT)
Facility: CLINIC | Age: 53
End: 2023-07-26
Payer: MEDICAID

## 2023-07-26 DIAGNOSIS — I10 ESSENTIAL (PRIMARY) HYPERTENSION: ICD-10-CM

## 2023-07-26 DIAGNOSIS — R73.03 PREDIABETES: ICD-10-CM

## 2023-07-26 DIAGNOSIS — R60.0 LEG EDEMA: ICD-10-CM

## 2023-07-26 DIAGNOSIS — E78.2 MIXED HYPERLIPIDEMIA: Primary | ICD-10-CM

## 2023-07-26 PROCEDURE — 99214 OFFICE O/P EST MOD 30 MIN: CPT | Performed by: NURSE PRACTITIONER

## 2023-07-26 ASSESSMENT — PATIENT HEALTH QUESTIONNAIRE - PHQ9
SUM OF ALL RESPONSES TO PHQ QUESTIONS 1-9: 0
1. LITTLE INTEREST OR PLEASURE IN DOING THINGS: 0
SUM OF ALL RESPONSES TO PHQ9 QUESTIONS 1 & 2: 0
2. FEELING DOWN, DEPRESSED OR HOPELESS: 0
SUM OF ALL RESPONSES TO PHQ QUESTIONS 1-9: 0

## 2023-07-26 NOTE — PROGRESS NOTES
Jo Ann Huff is a 47 y.o. female Established patient, here for evaluation of the following chief complaint(s):   Cholesterol Problem          Assessment & Plan:   Below is the assessment and plan developed based on review of pertinent history, physical exam, labs, studies, and medications. 1. Mixed hyperlipidemia  Stable, monitor. Continue atorvastatin use. - Lipid Panel; Future    2. Prediabetes  Stable, monitor. Low-fat, low-carb diet advised. - Hemoglobin A1C; Future    3. Leg edema  New, intermittent. Use of compression socks adivsed. 4. Essential (primary) hypertension  Stable, continue clonidine use. Specific pt instructions until next visit: follow low fat diet, follow low salt diet, routine labs ordered, have labs drawn prior to ROV, call if any problems    Subjective: Jo Ann Huff is a 47 y.o. female who was seen for Cholesterol Problem      Pt presents to f/u HTN, preDM, & CHOL. Home BP readings running 120/60's. Taking meds as prescribed. Denies side effects from medication. Feels good overall. No report of unilateral weakness, headaches, blurring vision, CP, or SOB. +leg swelling. No report of polydipsia, polyphagia, or polyuria.    BP Readings from Last 3 Encounters:   01/20/23 120/83   05/19/22 116/87   11/18/21 125/80       Last Point of Care HGB A1C  Hemoglobin A1C, POC   Date Value Ref Range Status   05/19/2022 6.0 % Final      Lab Results   Component Value Date/Time    [id number] 6.0 05/19/2022 11:00 AM       Lab Results   Component Value Date    CHOL 206 (H) 01/20/2023    CHOL 213 (H) 11/18/2021    CHOL 199 08/11/2020     Lab Results   Component Value Date    TRIG 89 01/20/2023    TRIG 91 11/18/2021    TRIG 249 (H) 08/11/2020     Lab Results   Component Value Date    HDL 55 01/20/2023    HDL 70 11/18/2021    HDL 57 08/11/2020     Lab Results   Component Value Date    LDLCALC 135 (H) 01/20/2023    LDLCALC 124.8 (H) 11/18/2021    [facility] 92 08/11/2020     Lab Results

## 2023-10-26 DIAGNOSIS — I10 ESSENTIAL (PRIMARY) HYPERTENSION: ICD-10-CM

## 2023-10-27 ENCOUNTER — APPOINTMENT (OUTPATIENT)
Facility: HOSPITAL | Age: 53
End: 2023-10-27
Payer: MEDICAID

## 2023-10-27 ENCOUNTER — HOSPITAL ENCOUNTER (EMERGENCY)
Facility: HOSPITAL | Age: 53
Discharge: HOME | End: 2023-10-27
Payer: MEDICAID

## 2023-10-27 VITALS
WEIGHT: 155 LBS | HEIGHT: 65 IN | BODY MASS INDEX: 25.83 KG/M2 | OXYGEN SATURATION: 100 % | SYSTOLIC BLOOD PRESSURE: 130 MMHG | DIASTOLIC BLOOD PRESSURE: 91 MMHG | TEMPERATURE: 98.1 F | RESPIRATION RATE: 16 BRPM | HEART RATE: 74 BPM

## 2023-10-27 DIAGNOSIS — M25.512 ACUTE PAIN OF LEFT SHOULDER: ICD-10-CM

## 2023-10-27 DIAGNOSIS — S16.1XXA STRAIN OF NECK MUSCLE, INITIAL ENCOUNTER: Primary | ICD-10-CM

## 2023-10-27 LAB
EKG ATRIAL RATE: 71 BPM
EKG DIAGNOSIS: NORMAL
EKG P AXIS: 43 DEGREES
EKG P-R INTERVAL: 190 MS
EKG Q-T INTERVAL: 378 MS
EKG QRS DURATION: 74 MS
EKG QTC CALCULATION (BAZETT): 410 MS
EKG R AXIS: 14 DEGREES
EKG T AXIS: 29 DEGREES
EKG VENTRICULAR RATE: 71 BPM

## 2023-10-27 PROCEDURE — 71045 X-RAY EXAM CHEST 1 VIEW: CPT

## 2023-10-27 PROCEDURE — 72050 X-RAY EXAM NECK SPINE 4/5VWS: CPT

## 2023-10-27 PROCEDURE — 99284 EMERGENCY DEPT VISIT MOD MDM: CPT

## 2023-10-27 RX ORDER — CYCLOBENZAPRINE HCL 10 MG
10 TABLET ORAL 3 TIMES DAILY PRN
Qty: 21 TABLET | Refills: 0 | Status: SHIPPED | OUTPATIENT
Start: 2023-10-27 | End: 2023-11-06

## 2023-10-27 RX ORDER — CLONIDINE HYDROCHLORIDE 0.2 MG/1
0.2 TABLET ORAL
Qty: 90 TABLET | Refills: 3 | Status: SHIPPED | OUTPATIENT
Start: 2023-10-27

## 2023-10-27 RX ORDER — NAPROXEN 500 MG/1
500 TABLET ORAL 2 TIMES DAILY
Qty: 20 TABLET | Refills: 0 | Status: SHIPPED | OUTPATIENT
Start: 2023-10-27

## 2023-10-27 ASSESSMENT — PAIN DESCRIPTION - DESCRIPTORS: DESCRIPTORS: ACHING;SORE

## 2023-10-27 ASSESSMENT — PAIN - FUNCTIONAL ASSESSMENT: PAIN_FUNCTIONAL_ASSESSMENT: 0-10

## 2023-10-27 ASSESSMENT — PAIN DESCRIPTION - LOCATION: LOCATION: SHOULDER;ARM

## 2023-10-27 ASSESSMENT — ENCOUNTER SYMPTOMS
SHORTNESS OF BREATH: 0
ABDOMINAL PAIN: 0
BACK PAIN: 0

## 2023-10-27 ASSESSMENT — PAIN DESCRIPTION - ORIENTATION: ORIENTATION: LEFT

## 2023-10-27 NOTE — ED PROVIDER NOTES
tablet  Commonly known as: LIPITOR     busPIRone 15 MG tablet  Commonly known as: BUSPAR     cloNIDine 0.2 MG tablet  Commonly known as: CATAPRES  Take 1 tablet by mouth nightly  Ask about: Which instructions should I use?     diclofenac 75 MG EC tablet  Commonly known as: VOLTAREN               Where to Get Your Medications        These medications were sent to [address] [place] [address], [id number] [place] [address],[address], [facility]      Phone: [phone]   cloNIDine 0.2 MG tablet  cyclobenzaprine 10 MG tablet  naproxen 500 MG tablet           DISCONTINUED MEDICATIONS:  Discharge Medication List as of 10/27/2023 10:42 AM          I have seen and evaluated the patient autonomously. My supervision physician was on site and available for consultation if needed. I am the Primary Clinician of Record. IRAIDA Barahona NP (electronically signed)    (Please note that parts of this dictation were completed with voice recognition software. Quite often unanticipated grammatical, syntax, homophones, and other interpretive errors are inadvertently transcribed by the computer software. Please disregards these errors.  Please excuse any errors that have escaped final proofreading.)        IRAIDA Nicolas NP  10/27/23 [id number]

## 2023-10-27 NOTE — ED NOTES
Pt given discharge papers. Three E prescriptions for Clonidine, Cyclobenzaprine, Naproxen sent to patients pharmacy. Pt educated on discharge papers and prescriptions. Pt instructed to follow up with PCP. Pt verbalizes understanding and denies any further questions. Pt ambulated to waiting room with steady gait, alert and oriented x4.        Regis Brown RN  10/27/23 [id number]
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
## 2023-11-01 ENCOUNTER — TELEMEDICINE (OUTPATIENT)
Facility: CLINIC | Age: 53
End: 2023-11-01
Payer: MEDICAID

## 2023-11-01 DIAGNOSIS — R10.9 ACUTE LEFT FLANK PAIN: Primary | ICD-10-CM

## 2023-11-01 DIAGNOSIS — F41.9 ANXIETY: ICD-10-CM
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
## 2023-11-01 DIAGNOSIS — Z02.9 ADMINISTRATIVE ENCOUNTER: ICD-10-CM

## 2023-11-01 PROCEDURE — 99213 OFFICE O/P EST LOW 20 MIN: CPT | Performed by: NURSE PRACTITIONER
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
## 2024-02-06 RX ORDER — BUSPIRONE HYDROCHLORIDE 15 MG/1
15 TABLET ORAL 2 TIMES DAILY
Qty: 180 TABLET | Refills: 0 | Status: SHIPPED | OUTPATIENT
Start: 2024-02-06

## 2024-02-06 RX ORDER — ATORVASTATIN CALCIUM 20 MG/1
20 TABLET, FILM COATED ORAL NIGHTLY
Qty: 90 TABLET | Refills: 0 | Status: SHIPPED | OUTPATIENT
Start: 2024-02-06

## 2024-02-06 NOTE — TELEPHONE ENCOUNTER
Last appointment: 11/01/2023 GÉNESIS Becerril   Next appointment: 03/21/2024 GÉNESIS Becerril   Previous refill encounter(s):   01/20/2023 Buspar #180 with 3 refills,   01/23/2023 Lipitor #90 with 3 refills.     For Pharmacy Admin Tracking Only    Program: Medication Refill  Intervention Detail: New Rx: 2, reason: Patient Preference  Time Spent (min): 5    Requested Prescriptions     Pending Prescriptions Disp Refills    busPIRone (BUSPAR) 15 MG tablet [Pharmacy Med Name: busPIRone HCl 15 MG Oral Tablet] 180 tablet 0     Sig: Take 1 tablet by mouth twice daily    atorvastatin (LIPITOR) 20 MG tablet [Pharmacy Med Name: Atorvastatin Calcium 20 MG Oral Tablet] 90 tablet 0     Sig: Take 1 tablet by mouth nightly

## 2024-03-21 ENCOUNTER — OFFICE VISIT (OUTPATIENT)
Facility: CLINIC | Age: 54
End: 2024-03-21
Payer: MEDICAID

## 2024-03-21 VITALS
HEIGHT: 65 IN | DIASTOLIC BLOOD PRESSURE: 86 MMHG | RESPIRATION RATE: 17 BRPM | HEART RATE: 78 BPM | SYSTOLIC BLOOD PRESSURE: 125 MMHG | WEIGHT: 166 LBS | TEMPERATURE: 98 F | BODY MASS INDEX: 27.66 KG/M2 | OXYGEN SATURATION: 97 %

## 2024-03-21 DIAGNOSIS — I10 PRIMARY HYPERTENSION: ICD-10-CM

## 2024-03-21 DIAGNOSIS — B35.3 TINEA PEDIS OF RIGHT FOOT: ICD-10-CM

## 2024-03-21 DIAGNOSIS — F41.1 GAD (GENERALIZED ANXIETY DISORDER): ICD-10-CM

## 2024-03-21 DIAGNOSIS — E78.2 MIXED HYPERLIPIDEMIA: ICD-10-CM

## 2024-03-21 DIAGNOSIS — Z12.11 SPECIAL SCREENING FOR MALIGNANT NEOPLASMS, COLON: ICD-10-CM

## 2024-03-21 DIAGNOSIS — R73.03 PREDIABETES: ICD-10-CM

## 2024-03-21 DIAGNOSIS — Z12.31 ENCOUNTER FOR SCREENING MAMMOGRAM FOR MALIGNANT NEOPLASM OF BREAST: ICD-10-CM

## 2024-03-21 DIAGNOSIS — E78.2 MIXED HYPERLIPIDEMIA: Primary | ICD-10-CM

## 2024-03-21 PROBLEM — R03.0 ELEVATED BP WITHOUT DIAGNOSIS OF HYPERTENSION: Status: ACTIVE | Noted: 2024-03-21

## 2024-03-21 PROCEDURE — 99214 OFFICE O/P EST MOD 30 MIN: CPT | Performed by: NURSE PRACTITIONER

## 2024-03-21 PROCEDURE — 3079F DIAST BP 80-89 MM HG: CPT | Performed by: NURSE PRACTITIONER

## 2024-03-21 PROCEDURE — 3074F SYST BP LT 130 MM HG: CPT | Performed by: NURSE PRACTITIONER

## 2024-03-21 RX ORDER — NYSTATIN 100000 [USP'U]/G
POWDER TOPICAL
Qty: 30 G | Refills: 0 | Status: SHIPPED | OUTPATIENT
Start: 2024-03-21

## 2024-03-21 RX ORDER — ATORVASTATIN CALCIUM 20 MG/1
20 TABLET, FILM COATED ORAL NIGHTLY
Qty: 90 TABLET | Refills: 2 | Status: SHIPPED | OUTPATIENT
Start: 2024-05-01

## 2024-03-21 RX ORDER — BUSPIRONE HYDROCHLORIDE 15 MG/1
15 TABLET ORAL 2 TIMES DAILY
Qty: 180 TABLET | Refills: 2 | Status: SHIPPED | OUTPATIENT
Start: 2024-05-01

## 2024-03-21 SDOH — ECONOMIC STABILITY: HOUSING INSECURITY
IN THE LAST 12 MONTHS, WAS THERE A TIME WHEN YOU DID NOT HAVE A STEADY PLACE TO SLEEP OR SLEPT IN A SHELTER (INCLUDING NOW)?: NO

## 2024-03-21 SDOH — ECONOMIC STABILITY: FOOD INSECURITY: WITHIN THE PAST 12 MONTHS, YOU WORRIED THAT YOUR FOOD WOULD RUN OUT BEFORE YOU GOT MONEY TO BUY MORE.: NEVER TRUE

## 2024-03-21 SDOH — ECONOMIC STABILITY: INCOME INSECURITY: HOW HARD IS IT FOR YOU TO PAY FOR THE VERY BASICS LIKE FOOD, HOUSING, MEDICAL CARE, AND HEATING?: NOT HARD AT ALL

## 2024-03-21 SDOH — ECONOMIC STABILITY: FOOD INSECURITY: WITHIN THE PAST 12 MONTHS, THE FOOD YOU BOUGHT JUST DIDN'T LAST AND YOU DIDN'T HAVE MONEY TO GET MORE.: NEVER TRUE

## 2024-03-21 ASSESSMENT — PATIENT HEALTH QUESTIONNAIRE - PHQ9
SUM OF ALL RESPONSES TO PHQ9 QUESTIONS 1 & 2: 0
1. LITTLE INTEREST OR PLEASURE IN DOING THINGS: NOT AT ALL
SUM OF ALL RESPONSES TO PHQ QUESTIONS 1-9: 0
2. FEELING DOWN, DEPRESSED OR HOPELESS: NOT AT ALL
SUM OF ALL RESPONSES TO PHQ QUESTIONS 1-9: 0

## 2024-03-21 ASSESSMENT — ANXIETY QUESTIONNAIRES
1. FEELING NERVOUS, ANXIOUS, OR ON EDGE: NOT AT ALL
IF YOU CHECKED OFF ANY PROBLEMS ON THIS QUESTIONNAIRE, HOW DIFFICULT HAVE THESE PROBLEMS MADE IT FOR YOU TO DO YOUR WORK, TAKE CARE OF THINGS AT HOME, OR GET ALONG WITH OTHER PEOPLE: NOT DIFFICULT AT ALL
4. TROUBLE RELAXING: NOT AT ALL
5. BEING SO RESTLESS THAT IT IS HARD TO SIT STILL: NOT AT ALL
7. FEELING AFRAID AS IF SOMETHING AWFUL MIGHT HAPPEN: NOT AT ALL
3. WORRYING TOO MUCH ABOUT DIFFERENT THINGS: NOT AT ALL
6. BECOMING EASILY ANNOYED OR IRRITABLE: NOT AT ALL
2. NOT BEING ABLE TO STOP OR CONTROL WORRYING: NOT AT ALL
GAD7 TOTAL SCORE: 0

## 2024-03-21 NOTE — PROGRESS NOTES
Latanya Prado 1970 is a 53 y.o. female, Established patient, here for evaluation of the following chief complaint(s):  Follow-up (6 months) and Foot Pain (Between the toes )      ASSESSMENT/PLAN:  Below is the assessment and plan developed based on review of pertinent history, physical exam, labs, studies, and medications.    1. Mixed hyperlipidemia  Status unknown, labs ordered to monitor. Continue atorvastatin.   - Lipid Panel; Future  - CBC with Auto Differential; Future  - atorvastatin (LIPITOR) 20 MG tablet; Take 1 tablet by mouth nightly  Dispense: 90 tablet; Refill: 2    2. Encounter for screening mammogram for malignant neoplasm of breast    - OUMOU KARYN DIGITAL SCREEN BILATERAL; Future    3. Special screening for malignant neoplasms, colon    - Cologuard (Fecal DNA Colorectal Cancer Screening)    4. Primary hypertension  Stable, continue nightly clonidine use.   - TSH; Future  - CBC with Auto Differential; Future  - Comprehensive Metabolic Panel; Future  - Vitamin D 25 Hydroxy; Future    5. Prediabetes  Status unknown, labs ordered to monitor.  - Comprehensive Metabolic Panel; Future  - Hemoglobin A1C; Future    6. Tinea pedis of right foot  New, trial of nystatin powder.   - nystatin (MYCOSTATIN) 715131 UNIT/GM powder; Apply 3 times daily.  Dispense: 30 g; Refill: 0    7. ZONIA (generalized anxiety disorder)  Stable, continue buspar BID use. Effective.   - busPIRone (BUSPAR) 15 MG tablet; Take 15 mg by mouth 2 times daily  Dispense: 180 tablet; Refill: 2        Follow-up and Dispositions    Return for OV 6 mo- HTN, CHOL, Anxiety.         Pt asked to complete follow by next visit: Call if any problems    SUBJECTIVE/OBJECTIVE:  HPI    Pt presents to f/u HTN, preDM, ZONIA, & CHOL. No home BP readings or blood sugar level. Taking meds as prescribed. Denies side effects from medication. Feels good, but has concern for area b/t right 4th and 5th toe with irritation. Keeps moist and clean, not improving however.

## 2024-03-21 NOTE — PROGRESS NOTES
Pt is here for   Chief Complaint   Patient presents with    Follow-up     6 months    Foot Pain     Between the toes      \"Have you been to the ER, urgent care clinic since your last visit?  Hospitalized since your last visit?\"    NO    “Have you seen or consulted any other health care providers outside of [facility] since your last visit?”    NO    Have you had a mammogram?”   NO    Date of last Mammogram: 6/12/2018         “Have you had a colorectal cancer screening such as a colonoscopy/FIT/Cologuard?    NO    No colonoscopy on file  No cologuard on file  Date of last FIT: 5/27/2021   No flexible sigmoidoscopy on file         Click Here for Release of Records Request

## 2024-03-22 DIAGNOSIS — E55.9 VITAMIN D DEFICIENCY: Primary | ICD-10-CM

## 2024-03-22 LAB
25(OH)D3 SERPL-MCNC: 11.5 NG/ML (ref 30–100)
ALBUMIN SERPL-MCNC: 4 G/DL (ref 3.5–5)
ALBUMIN/GLOB SERPL: 1.1 (ref 1.1–2.2)
ALP SERPL-CCNC: 76 U/L (ref 45–117)
ALT SERPL-CCNC: 26 U/L (ref 12–78)
ANION GAP SERPL CALC-SCNC: 3 MMOL/L (ref 5–15)
AST SERPL-CCNC: 16 U/L (ref 15–37)
BASOPHILS # BLD: 0 K/UL (ref 0–0.1)
BASOPHILS NFR BLD: 1 % (ref 0–1)
BILIRUB SERPL-MCNC: 0.3 MG/DL (ref 0.2–1)
BUN SERPL-MCNC: 15 MG/DL (ref 6–20)
BUN/CREAT SERPL: 19 (ref 12–20)
CALCIUM SERPL-MCNC: 9.4 MG/DL (ref 8.5–10.1)
CHLORIDE SERPL-SCNC: 109 MMOL/L (ref 97–108)
CHOLEST SERPL-MCNC: 149 MG/DL
CO2 SERPL-SCNC: 30 MMOL/L (ref 21–32)
CREAT SERPL-MCNC: 0.77 MG/DL (ref 0.55–1.02)
DIFFERENTIAL METHOD BLD: ABNORMAL
EOSINOPHIL # BLD: 0.1 K/UL (ref 0–0.4)
EOSINOPHIL NFR BLD: 3 % (ref 0–7)
ERYTHROCYTE [DISTWIDTH] IN BLOOD BY AUTOMATED COUNT: 12.3 % (ref 11.5–14.5)
EST. AVERAGE GLUCOSE BLD GHB EST-MCNC: 128 MG/DL
GLOBULIN SER CALC-MCNC: 3.8 G/DL (ref 2–4)
GLUCOSE SERPL-MCNC: 107 MG/DL (ref 65–100)
HBA1C MFR BLD: 6.1 % (ref 4–5.6)
HCT VFR BLD AUTO: 43.8 % (ref 35–47)
HDLC SERPL-MCNC: 56 MG/DL
HDLC SERPL: 2.7 (ref 0–5)
HGB BLD-MCNC: 13.3 G/DL (ref 11.5–16)
IMM GRANULOCYTES # BLD AUTO: 0.1 K/UL (ref 0–0.04)
IMM GRANULOCYTES NFR BLD AUTO: 2 % (ref 0–0.5)
LDLC SERPL CALC-MCNC: 78.6 MG/DL (ref 0–100)
LYMPHOCYTES # BLD: 2 K/UL (ref 0.8–3.5)
LYMPHOCYTES NFR BLD: 45 % (ref 12–49)
MCH RBC QN AUTO: 28.4 PG (ref 26–34)
MCHC RBC AUTO-ENTMCNC: 30.4 G/DL (ref 30–36.5)
MCV RBC AUTO: 93.6 FL (ref 80–99)
MONOCYTES # BLD: 0.4 K/UL (ref 0–1)
MONOCYTES NFR BLD: 8 % (ref 5–13)
NEUTS SEG # BLD: 1.8 K/UL (ref 1.8–8)
NEUTS SEG NFR BLD: 41 % (ref 32–75)
NRBC # BLD: 0 K/UL (ref 0–0.01)
NRBC BLD-RTO: 0 PER 100 WBC
PLATELET # BLD AUTO: 299 K/UL (ref 150–400)
PMV BLD AUTO: 11.6 FL (ref 8.9–12.9)
POTASSIUM SERPL-SCNC: 4.4 MMOL/L (ref 3.5–5.1)
PROT SERPL-MCNC: 7.8 G/DL (ref 6.4–8.2)
RBC # BLD AUTO: 4.68 M/UL (ref 3.8–5.2)
SODIUM SERPL-SCNC: 142 MMOL/L (ref 136–145)
TRIGL SERPL-MCNC: 72 MG/DL
TSH SERPL DL<=0.05 MIU/L-ACNC: 1.06 UIU/ML (ref 0.36–3.74)
VLDLC SERPL CALC-MCNC: 14.4 MG/DL
WBC # BLD AUTO: 4.4 K/UL (ref 3.6–11)

## 2024-03-22 RX ORDER — ERGOCALCIFEROL 1.25 MG/1
50000 CAPSULE ORAL WEEKLY
Qty: 12 CAPSULE | Refills: 3 | Status: SHIPPED | OUTPATIENT
Start: 2024-03-22

## 2024-03-22 NOTE — RESULT ENCOUNTER NOTE
Overall your labs look good, but...    You remain prediabetic, your A1C has not risen or lowered. Work on your DIET to help lower it, by avoid high-fat, high-carb foots.     You have a VITAMIN D DEFICIENCY. Please start new prescription for Vitamin D sent to pharmacy. Also try getting at least one hour of direct sunlight helps raise your vitamin D level.

## 2024-07-17 NOTE — PROGRESS NOTES
"Subjective   Ana Troy is a 66 y.o. female who presents to the [facility] ffor follow up of atypical chest pain and CAD risk factors. Last seen January 2024.     Since our last visit, she has had increased exertional dyspnea with walking 1 block or 1 flight of stairs. Repeat echocardiogram 3 weeks ago showed elevated LV filling pressure (E/e' ratio 13, RVSP 42 mm Hg) due  to early stage diastolic heart failure / diastolic dysfunction.     Unchanged frequency of episodes of left chest wall soreness consistent with prior episode of atypical chest pain in April 2021 evaluated in [facility]. Similar sensation of chest wall \"spasm\" or sharp pain lasting for 30-60s seconds that are not related to exertion. Occurs 2x/week. Similar to discomfort described in 2019 and 2020. Has cervical spine arthritis as well.    August 2019 stress echocardiogram treadmill test showed no ischemia, and exercise tolerance of 7 METs with near peak HR achieved.     No active cardiac symptoms of PND, orthopnea, KRYSTYNA, palpitations, syncope, or claudication.      9/16/2020 CT calcium score 54. NANCE risk score for MI 6%.     Past Medical History:  1. Hypertension  2. h/o PCoA aneurysm rupture with SAH in 2002  3. h/o Hep C  4. GERD  5. Coronary arteriosclerosis: Small amount (9/16/2020 CT calcium score 54) with 10 year risk for MI 6%. Taking aspirin 81 mg a day. LDL < 100 in 2021,  5/23/2022.  6. Borderline dyslipidemia: control with lifestyle modification program     Social History:  Former smoker (quit in 2006)     Family History:  Sister had MI at age 53 yo.     Review of Systems    A 14 point review of systems was asked. All questions were negative except for pertinent positives listed in the HPI.     Current Outpatient Medications on File Prior to Visit   Medication Sig Dispense Refill    aspirin 81 mg EC tablet Take 1 tablet (81 mg) by mouth once daily.      calcium carbonate-vitamin D3 500 " Gris Villegas is a 49 y.o. female established patient, here for evaluation of the following chief complaint(s):   Follow-up (HTN, A1C DOXY. -[phone])          Assessment & Plan:   Diagnoses and all orders for this visit:    1. Special screening for malignant neoplasms, colon  -     REFERRAL TO GASTROENTEROLOGY; Future    2. Cigarette nicotine dependence without complication    3. Essential hypertension  -     METABOLIC PANEL, BASIC; Future    4. Anxiety    5. History of rectal bleeding  -     CBC W/O DIFF; Future  -     REFERRAL TO GASTROENTEROLOGY; Future    6. Vitamin D deficiency  -     VITAMIN D, 25 HYDROXY; Future    7. Prediabetes  -     METABOLIC PANEL, BASIC; Future  -     HEMOGLOBIN A1C WITH EAG; Future    8. Screening for lipid disorders    9. Hypertriglyceridemia without hypercholesterolemia  -     LIPID PANEL; Future      Follow-up and Dispositions    · Return for keep nov appt for pap as scheduled. .           We discussed the expected course, resolution and complications of the diagnosis(es) in detail. Medication risks, benefits, costs, interactions, and alternatives were discussed as indicated. I advised her to contact the office if her condition worsens, changes or fails to improve as anticipated. She expressed understanding with the diagnosis(es) and plan. Specific pt instructions until next visit: continue present plan, routine labs ordered, have labs drawn prior to ROV, watch for signs of COVID since potentially exposed by daughters    Subjective: Gris Villegas is a 49 y.o. female who was seen for Follow-up (HTN, A1C DOXY. -[phone])      Pt presents to f/u HTN and A1C. Home BP readings running 120/80's. Taking clonidine, told other med not available when picked up recently. Stopped Vit D since out in sun more lately. Denies side effects from medication. Feels good, but more anxious lately and daughters have COVID.  Unsure if she needs to get tested, but they do NOT live with "mg-5 mcg (200 unit) tablet Take 1 tablet by mouth once daily. 90 tablet 11    capsaicin (Zostrix) 0.025 % cream Apply 1 Application topically every 8 hours if needed for mild pain (1 - 3). 56.6 g 3    chlorthalidone (Hygroton) 25 mg tablet Take 1 tablet (25 mg) by mouth once daily. 90 tablet 0    famotidine (Pepcid) 20 mg tablet Take 1 tablet (20 mg) by mouth every 12 hours. 90 tablet 3    fluticasone (Flonase) 50 mcg/actuation nasal spray 1 spray by Does not apply route once daily.      lidocaine (Lidoderm) 5 % patch Place 1 patch over 12 hours on the skin once daily. Apply to painful area 12 hours per day, remove for 12 hours. 1 patch 11    loratadine (Claritin) 10 mg tablet Take 1 tablet (10 mg) by mouth once daily at bedtime.      losartan (Cozaar) 25 mg tablet Take 4 tablets (100 mg) by mouth once daily. 360 tablet 0    magnesium oxide (Mag-Ox) 400 mg (241.3 mg magnesium) tablet Take 1 tablet (400 mg) by mouth once daily.      meloxicam (Mobic) 7.5 mg tablet Take 1 tablet (7.5 mg) by mouth once daily. 30 tablet 11    potassium chloride CR 20 mEq ER tablet Take 1 tablet (20 mEq) by mouth once daily. Do not crush or chew.      tiZANidine (Zanaflex) 2 mg tablet Take 1 tablet (2 mg) by mouth as needed at bedtime for muscle spasms. 30 tablet 3     No current facility-administered medications on file prior to visit.          Objective   Physical Exam  BP Readings from Last 3 Encounters:   07/17/24 129/73   07/16/24 166/80   06/04/24 124/75      Wt Readings from Last 3 Encounters:   07/17/24 83.5 kg (184 lb)   07/16/24 83 kg (183 lb)   06/04/24 87.3 kg (192 lb 6.4 oz)      BMI: Estimated body mass index is 34.77 kg/m² as calculated from the following:    Height as of this encounter: 1.549 m (5' 1\").    Weight as of this encounter: 83.5 kg (184 lb).  BSA: Estimated body surface area is 1.9 meters squared as calculated from the following:    Height as of this encounter: 1.549 m (5' 1\").    Weight as of this encounter: " her and she was NOT around them recently. Asymptomatic and fully vaccinated. Reports rectal bleeding with ER visit a few months ago, has since changed diet and no further episodes. Advised to see GI for colonoscopy, but wanted to check with PCP before proceeding. Smoking less, but no longer using patches. No report of unilateral weakness, headaches, blurring vision, CP, SOB,or  leg swelling. No report of polydipsia, polyphagia, or polyuria. Patient Active Problem List    Diagnosis Date Noted    Vitamin D deficiency 01/16/2017    Eczema 01/12/2017    Anxiety 11/16/2015     Current Outpatient Medications   Medication Sig Dispense Refill    cloNIDine HCL (CATAPRES) 0.2 mg tablet Take 1 tablet by mouth nightly 90 Tablet 0    busPIRone (BUSPAR) 15 mg tablet Take 1 tablet by mouth twice daily 180 Tablet 0     No Known Allergies  No past medical history on file. Past Surgical History:   Procedure Laterality Date    HX PARTIAL HYSTERECTOMY         Review of Systems   Constitutional: Negative for fever and malaise/fatigue. Eyes: Negative for blurred vision. Respiratory: Negative for cough and shortness of breath. Cardiovascular: Negative for chest pain and leg swelling. Neurological: Negative for dizziness, weakness and headaches. Objective:   Vital Signs: (As obtained by patient/caregiver at home)  There were no vitals taken for this visit. Physical Exam:  General appearance - alert, well  appearing, and in no distress. Mental status - A/O x 4, mildly anxious mood and affect. Eyes- mild periorbital edema, drainage, or irritation noted. Nose- no obvious drainage or swelling. Throat- no obvious swelling, goiter, or notable lymphadenopathy  Chest - Symmetric chest rise. No wheezing or coughing. No distress. Skin- normal skin tone noted. No hyperpigmentation or obvious deformities. No diaphoresis noted. No flushing. Neuro - Normal speech, no focal findings or movement disorder. "83.5 kg (184 lb).    General: no acute distress  HEENT: EOMI, no scleral icterus.  Lungs: Clear to auscultation bilaterally without wheezing, rales, or rhonchi.  Cardiovascular: Regular rhythm and rate. Normal S1 and S2. No murmurs, rubs, or gallops are appreciated. JVP normal.  Abdomen: Soft, nontender, nondistended. Bowel sounds present.  Extremities: Warm and well perfused with equal 2+ pulses bilaterally.  No edema present.  Neurologic: Alert and oriented x3.    I have personally reviewed the following images and laboratory findings:  Last echocardiogram:   2024 echo: LV EF 60-65%, no LVH (LVMI 66 gm/m2), impaired relaxation diastology (E/e' 12), normal LA size (JAYESH 33 ml/m2), normal RV/RA, no AI, mild MR, mild TR, RVSP 42 mm Hg (RAP 3 mm Hg).     Last cath / stress test: 2019 stress echocardiogram treadmill test showed no ischemia, and exercise tolerance of 7 METs with near peak HR achieved    2020 CT calcium score 54. NANCE risk score for MI 6%.    Most recent EC2023 ECG: Sinus rhythm, 56 bpm, sinus arrhythmia. Normal ECG. Personally reviewed in office.     - 6/15/2024 HR monitor: Average HR 61 bpm (40 - 107 bpm range), rare PAC/PVCs, no AFib or other abnormal tachyarrhythmia.    Lab Results   Component Value Date    CHOL 194 01/10/2024    CHOL 187 2022    CHOL 192 2022     Lab Results   Component Value Date    HDL 53.3 01/10/2024    HDL 55.2 2022    HDL 57.8 2022     Lab Results   Component Value Date    LDLCALC 116 (H) 01/10/2024     Lab Results   Component Value Date    TRIG 126 01/10/2024    TRIG 120 2022    TRIG 81 2022     No components found for: \"CHOLHDL\"   2022 ; 2021 LDL 95     Assessment/Plan   1. Atypical chest pain:  Chest wall spasms not related to exertion. Most recent in 2021 at [facility]. Had stress test in 2019 for similar symptoms without no evidence of ischemia. Walking 30 minutes a day " Other pertinent observable physical exam findings:-              Dieter Gunderson is being evaluated by a Virtual Visit (video visit) encounter to address concerns as mentioned above. A caregiver was present when appropriate. Due to this being a TeleHealth encounter (During NWXRX-15 public health emergency), evaluation of the following organ systems was limited: Vitals/Constitutional/EENT/Resp/CV/GI//MS/Neuro/Skin/Heme-Lymph-Imm. Pursuant to the emergency declaration under the [address] [place] [address] and the [employer] and [employer] Act, this Virtual Visit was conducted with patient's (and/or legal guardian's) consent, to reduce the patient's risk of exposure to COVID-19 and provide necessary medical care. The patient (and/or legal guardian) has also been advised to contact this office for worsening conditions or problems, and seek emergency medical treatment and/or call 911 if deemed necessary. Patient identification was verified at the start of the visit: YES    Services were provided through a video synchronous discussion virtually to substitute for in-person clinic visit. Patient and provider were located at their individual homes. An electronic signature was used to authenticate this note.   -- Laura Villanueva NP without provoking symptoms. Will observe.     2. CAD risk factors:  2020 CT calcium score was 54. 10 year NANCE risk score for MI was 6%. CAD risk factors of history of family history of MI (sister  at age 53 yo of MI), and hypertension.  - continue aspirin 81 mg a day  - goal  (1/10/2024  above goal) taking fish oil capsules and lifestyle modification program  - physical activity now at goal with new exercise program    3. Chronic diastolic heart failure / hypertensive heart disease:  Continue losartan 100 mg a day and chlorthalidone 25 mg a day. Add Jardiance 10 mg a day for early stage chronic diastolic heart failure to reduce LV filling pressure and reduce diastolic dysfunction. Goal BP range 120-130 mm Hg.     Follow up with Dr. George in 4 months.           SIGNATURE: Eliecer George MD PATIENT NAME: Ana Troy   DATE/TIME: 2024 2:33 PM MRN: [id number]

## 2024-10-09 DIAGNOSIS — I10 ESSENTIAL (PRIMARY) HYPERTENSION: ICD-10-CM

## 2024-10-09 RX ORDER — CLONIDINE HYDROCHLORIDE 0.2 MG/1
0.2 TABLET ORAL
Qty: 90 TABLET | Refills: 0 | Status: SHIPPED | OUTPATIENT
Start: 2024-10-09

## 2024-10-10 ENCOUNTER — OFFICE VISIT (OUTPATIENT)
Facility: CLINIC | Age: 54
End: 2024-10-10
Payer: MEDICAID

## 2024-10-10 VITALS
DIASTOLIC BLOOD PRESSURE: 79 MMHG | RESPIRATION RATE: 18 BRPM | BODY MASS INDEX: 27.99 KG/M2 | HEIGHT: 65 IN | OXYGEN SATURATION: 98 % | HEART RATE: 81 BPM | TEMPERATURE: 97.5 F | SYSTOLIC BLOOD PRESSURE: 136 MMHG | WEIGHT: 168 LBS

## 2024-10-10 DIAGNOSIS — E78.2 MIXED HYPERLIPIDEMIA: ICD-10-CM

## 2024-10-10 DIAGNOSIS — F41.1 GAD (GENERALIZED ANXIETY DISORDER): ICD-10-CM

## 2024-10-10 DIAGNOSIS — B35.3 TINEA PEDIS OF RIGHT FOOT: ICD-10-CM

## 2024-10-10 DIAGNOSIS — R73.03 PREDIABETES: ICD-10-CM

## 2024-10-10 DIAGNOSIS — I10 ESSENTIAL (PRIMARY) HYPERTENSION: Primary | ICD-10-CM

## 2024-10-10 PROCEDURE — 99214 OFFICE O/P EST MOD 30 MIN: CPT | Performed by: NURSE PRACTITIONER

## 2024-10-10 PROCEDURE — 3078F DIAST BP <80 MM HG: CPT | Performed by: NURSE PRACTITIONER

## 2024-10-10 PROCEDURE — 3075F SYST BP GE 130 - 139MM HG: CPT | Performed by: NURSE PRACTITIONER

## 2024-10-10 RX ORDER — ACETAMINOPHEN 500 MG
1000 TABLET ORAL 3 TIMES DAILY PRN
Qty: 120 TABLET | Refills: 5 | Status: SHIPPED | OUTPATIENT
Start: 2024-10-10

## 2024-10-10 RX ORDER — NYSTATIN 100000 [USP'U]/G
POWDER TOPICAL
Qty: 30 G | Refills: 0 | Status: SHIPPED | OUTPATIENT
Start: 2024-10-10

## 2024-10-10 RX ORDER — TERBINAFINE HYDROCHLORIDE 250 MG/1
250 TABLET ORAL DAILY
Qty: 14 TABLET | Refills: 0 | Status: SHIPPED | OUTPATIENT
Start: 2024-10-10 | End: 2024-10-24

## 2024-10-10 NOTE — PROGRESS NOTES
Latanya Prado 1970 is a 54 y.o. female, Established patient, here for evaluation of the following chief complaint(s):  Follow-up (6 months, HTN, CHOL, Anxiety)      ASSESSMENT/PLAN:  Below is the assessment and plan developed based on review of pertinent history, physical exam, labs, studies, and medications.       Diagnosis Orders   1. Essential (primary) hypertension        2. Mixed hyperlipidemia        3. Tinea pedis of right foot  nystatin (MYCOSTATIN) 710514 UNIT/GM powder    terbinafine (LAMISIL) 250 MG tablet      4. ZONIA (generalized anxiety disorder)        5. Prediabetes            HCC Dx review: n/a    Specific pt instructions until next visit: call if any problems, lamisil for tinea pedis. INI after therapy finished, reach out for referral to podiatry. HTN stable today, home BP monitoring encouraged despite feeling good. Repeat labs deferred today.     Current medication regimen is partially effective. See adjustments or prescriptions as noted above. Continue meds as prescribed.          Follow-up and Dispositions    Return in about 6 months (around 4/10/2025) for OV- HTN, preDM, CHOL, tinea pedis.           SUBJECTIVE/OBJECTIVE:  HPI    Pt presents to f/u HTN, preDM, anxiety, and HLD. No home BP checks since feels good. Taking meds as prescribed. Denies side effects from medication.   No report of unilateral weakness, headaches, blurring vision, CP, SOB,or  leg swelling. No report of polydipsia, polyphagia, or polyuria.       Acute complaint: wants refill for nystatin for recurrent foot fungus.  BP Readings from Last 3 Encounters:   10/10/24 136/79   03/21/24 125/86   10/27/23 (!) 130/91         3/21/2024     9:00 AM   ZONIA-7 SCREENING   Feeling nervous, anxious, or on edge Not at all   Not being able to stop or control worrying Not at all   Worrying too much about different things Not at all   Trouble relaxing Not at all   Being so restless that it is hard to sit still Not at all   Becoming easily

## 2024-10-10 NOTE — PROGRESS NOTES
Pt is here for   Chief Complaint   Patient presents with    Follow-up     6 months, HTN, CHOL, Anxiety      No colonoscopy on file  No cologuard on file  Date of last FIT: 5/27/2021   No flexible sigmoidoscopy on file

## 2025-01-02 DIAGNOSIS — I10 ESSENTIAL (PRIMARY) HYPERTENSION: ICD-10-CM

## 2025-01-02 RX ORDER — CLONIDINE HYDROCHLORIDE 0.2 MG/1
0.2 TABLET ORAL
Qty: 90 TABLET | Refills: 3 | Status: SHIPPED | OUTPATIENT
Start: 2025-01-02

## 2025-02-22 DIAGNOSIS — E55.9 VITAMIN D DEFICIENCY: ICD-10-CM

## 2025-02-22 DIAGNOSIS — B35.3 TINEA PEDIS OF RIGHT FOOT: ICD-10-CM

## 2025-02-24 RX ORDER — ERGOCALCIFEROL 1.25 MG/1
50000 CAPSULE, LIQUID FILLED ORAL WEEKLY
Qty: 12 CAPSULE | Refills: 3 | Status: SHIPPED | OUTPATIENT
Start: 2025-02-24

## 2025-02-24 RX ORDER — NYSTATIN 100000 [USP'U]/G
POWDER TOPICAL
Qty: 30 G | Refills: 2 | Status: SHIPPED | OUTPATIENT
Start: 2025-02-24

## 2025-03-22 DIAGNOSIS — E78.2 MIXED HYPERLIPIDEMIA: ICD-10-CM

## 2025-03-24 RX ORDER — ATORVASTATIN CALCIUM 20 MG/1
20 TABLET, FILM COATED ORAL NIGHTLY
Qty: 90 TABLET | Refills: 1 | Status: SHIPPED | OUTPATIENT
Start: 2025-03-24

## 2025-07-03 ENCOUNTER — OFFICE VISIT (OUTPATIENT)
Facility: CLINIC | Age: 55
End: 2025-07-03
Payer: MEDICAID

## 2025-07-03 VITALS
TEMPERATURE: 97.5 F | OXYGEN SATURATION: 99 % | WEIGHT: 168.8 LBS | HEIGHT: 65 IN | RESPIRATION RATE: 17 BRPM | HEART RATE: 71 BPM | SYSTOLIC BLOOD PRESSURE: 120 MMHG | BODY MASS INDEX: 28.12 KG/M2 | DIASTOLIC BLOOD PRESSURE: 82 MMHG

## 2025-07-03 DIAGNOSIS — I10 ESSENTIAL (PRIMARY) HYPERTENSION: ICD-10-CM

## 2025-07-03 DIAGNOSIS — E78.2 MIXED HYPERLIPIDEMIA: ICD-10-CM

## 2025-07-03 DIAGNOSIS — E55.9 VITAMIN D DEFICIENCY: ICD-10-CM

## 2025-07-03 DIAGNOSIS — R73.03 PREDIABETES: ICD-10-CM

## 2025-07-03 DIAGNOSIS — Z12.31 ENCOUNTER FOR SCREENING MAMMOGRAM FOR MALIGNANT NEOPLASM OF BREAST: ICD-10-CM

## 2025-07-03 DIAGNOSIS — Z12.11 SPECIAL SCREENING FOR MALIGNANT NEOPLASMS, COLON: ICD-10-CM

## 2025-07-03 DIAGNOSIS — F41.1 GAD (GENERALIZED ANXIETY DISORDER): Primary | ICD-10-CM

## 2025-07-03 DIAGNOSIS — Z87.891 FORMER SMOKER: ICD-10-CM

## 2025-07-03 PROCEDURE — 99214 OFFICE O/P EST MOD 30 MIN: CPT | Performed by: NURSE PRACTITIONER

## 2025-07-03 PROCEDURE — 3074F SYST BP LT 130 MM HG: CPT | Performed by: NURSE PRACTITIONER

## 2025-07-03 PROCEDURE — 3079F DIAST BP 80-89 MM HG: CPT | Performed by: NURSE PRACTITIONER

## 2025-07-03 RX ORDER — HYDROXYZINE HYDROCHLORIDE 10 MG/1
10 TABLET, FILM COATED ORAL 3 TIMES DAILY PRN
Qty: 30 TABLET | Refills: 2 | Status: SHIPPED | OUTPATIENT
Start: 2025-07-03

## 2025-07-03 RX ORDER — BUSPIRONE HYDROCHLORIDE 15 MG/1
15 TABLET ORAL 2 TIMES DAILY
Qty: 180 TABLET | Refills: 2 | Status: CANCELLED | OUTPATIENT
Start: 2025-07-03

## 2025-07-03 NOTE — PROGRESS NOTES
Latanya Prado 1970 is a 55 y.o. female, Established patient, here for evaluation of the following chief complaint(s):  6 Month Follow-Up (HTN, preDM, CHOL, tinea pedis.)      The patient (or guardian, if applicable) and other individuals in attendance with the patient were advised that Artificial Intelligence will be utilized during this visit to record, process the conversation to generate a clinical note, and support improvement of the AI technology. The patient (or guardian, if applicable) and other individuals in attendance at the appointment consented to the use of AI, including the recording. There may be incorrect pronoun references transcribed based on AI determining this from the recorded conversation.     ASSESSMENT/PLAN:  Below is the assessment and plan developed based on review of pertinent history, physical exam, labs, studies, and medications.       Diagnosis Orders   1. ZONIA (generalized anxiety disorder)            Assessment & Plan  1. Anxiety.  - Anxiety appears to be well-managed at present.  - Dosage of BuSpar will be reduced to 5 mg. Hydroxyzine 10 mg will be prescribed for use as needed during periods of heightened anxiety.  - Advised to practice deep breathing exercises and Emotional Freedom Technique (EFT) when feeling overwhelmed.  - If issues with anxiety arise before the next scheduled visit, [facility] for further suggestions or adjustments to medication regimen.    2. Hypertension.  - Blood pressure readings are within the normal range today at 120/82 mmHg.  - Continue current medication regimen of clonidine 0.2 mg at night.  - Patient reports similar blood pressure readings at home.    3. Prediabetes.  - A1c level recorded as 6.1 in 03/2024.  - Target A1c level set at 5.6 or below.  - Laboratory tests will be conducted today to monitor A1c levels.    4. Cholesterol management.  - Cholesterol levels within normal range during last check in 03/2024.  - Additional lab test

## 2025-07-03 NOTE — PROGRESS NOTES
Pt is here for   Chief Complaint   Patient presents with    6 Month Follow-Up     HTN, preDM, CHOL, tinea pedis.     Have you been to the ER, urgent care clinic since your last visit?  Hospitalized since your last visit?   NO    Have you seen or consulted any other health care providers outside our system since your last visit?   NO    Have you had a mammogram?”   NO    Date of last Mammogram: 6/12/2018       “Have you had a colorectal cancer screening such as a colonoscopy/FIT/Cologuard?    NO    No colonoscopy on file  No cologuard on file  Date of last FIT: 5/27/2021   No flexible sigmoidoscopy on file